# Patient Record
Sex: MALE | Race: WHITE | Employment: FULL TIME | URBAN - NONMETROPOLITAN AREA
[De-identification: names, ages, dates, MRNs, and addresses within clinical notes are randomized per-mention and may not be internally consistent; named-entity substitution may affect disease eponyms.]

---

## 2021-05-21 ENCOUNTER — HOSPITAL ENCOUNTER (OUTPATIENT)
Age: 51
Discharge: HOME | End: 2021-05-21
Payer: COMMERCIAL

## 2021-05-21 ENCOUNTER — HOSPITAL ENCOUNTER (OUTPATIENT)
Dept: GENERAL RADIOLOGY | Age: 51
Discharge: HOME | End: 2021-05-21
Payer: COMMERCIAL

## 2021-05-21 DIAGNOSIS — G89.29 CHRONIC PAIN OF BOTH KNEES: ICD-10-CM

## 2021-05-21 DIAGNOSIS — R60.9 EDEMA, UNSPECIFIED TYPE: ICD-10-CM

## 2021-05-21 DIAGNOSIS — Z00.00 WELLNESS EXAMINATION: ICD-10-CM

## 2021-05-21 DIAGNOSIS — M25.561 CHRONIC PAIN OF BOTH KNEES: ICD-10-CM

## 2021-05-21 DIAGNOSIS — M25.562 CHRONIC PAIN OF BOTH KNEES: ICD-10-CM

## 2021-05-21 LAB
ALBUMIN SERPL-MCNC: 4 G/DL (ref 3.5–5.1)
ALP BLD-CCNC: 79 U/L (ref 38–126)
ALT SERPL-CCNC: 23 U/L (ref 11–66)
ANION GAP SERPL CALCULATED.3IONS-SCNC: 7 MEQ/L (ref 8–16)
AST SERPL-CCNC: 15 U/L (ref 5–40)
BASOPHILS # BLD: 0.5 %
BASOPHILS ABSOLUTE: 0 THOU/MM3 (ref 0–0.1)
BILIRUB SERPL-MCNC: 0.3 MG/DL (ref 0.3–1.2)
BILIRUBIN DIRECT: < 0.2 MG/DL (ref 0–0.3)
BUN BLDV-MCNC: 16 MG/DL (ref 7–22)
CALCIUM SERPL-MCNC: 9.3 MG/DL (ref 8.5–10.5)
CHLORIDE BLD-SCNC: 102 MEQ/L (ref 98–111)
CHOLESTEROL, TOTAL: 203 MG/DL (ref 100–199)
CO2: 31 MEQ/L (ref 23–33)
CREAT SERPL-MCNC: 0.8 MG/DL (ref 0.4–1.2)
EOSINOPHIL # BLD: 1.4 %
EOSINOPHILS ABSOLUTE: 0.1 THOU/MM3 (ref 0–0.4)
ERYTHROCYTE [DISTWIDTH] IN BLOOD BY AUTOMATED COUNT: 14 % (ref 11.5–14.5)
ERYTHROCYTE [DISTWIDTH] IN BLOOD BY AUTOMATED COUNT: 50.9 FL (ref 35–45)
GFR SERPL CREATININE-BSD FRML MDRD: > 90 ML/MIN/1.73M2
GLUCOSE BLD-MCNC: 88 MG/DL (ref 70–108)
HCT VFR BLD CALC: 49.1 % (ref 42–52)
HDLC SERPL-MCNC: 31 MG/DL
HEMOGLOBIN: 15 GM/DL (ref 14–18)
IMMATURE GRANS (ABS): 0.02 THOU/MM3 (ref 0–0.07)
IMMATURE GRANULOCYTES: 0.2 %
LDL CHOLESTEROL CALCULATED: 145 MG/DL
LYMPHOCYTES # BLD: 19 %
LYMPHOCYTES ABSOLUTE: 1.6 THOU/MM3 (ref 1–4.8)
MCH RBC QN AUTO: 29.9 PG (ref 26–33)
MCHC RBC AUTO-ENTMCNC: 30.5 GM/DL (ref 32.2–35.5)
MCV RBC AUTO: 98 FL (ref 80–94)
MONOCYTES # BLD: 11 %
MONOCYTES ABSOLUTE: 0.9 THOU/MM3 (ref 0.4–1.3)
NUCLEATED RED BLOOD CELLS: 0 /100 WBC
PLATELET # BLD: 225 THOU/MM3 (ref 130–400)
PMV BLD AUTO: 9.1 FL (ref 9.4–12.4)
POTASSIUM SERPL-SCNC: 4 MEQ/L (ref 3.5–5.2)
RBC # BLD: 5.01 MILL/MM3 (ref 4.7–6.1)
SEG NEUTROPHILS: 67.9 %
SEGMENTED NEUTROPHILS ABSOLUTE COUNT: 5.7 THOU/MM3 (ref 1.8–7.7)
SODIUM BLD-SCNC: 140 MEQ/L (ref 135–145)
TOTAL PROTEIN: 7.4 G/DL (ref 6.1–8)
TRIGL SERPL-MCNC: 133 MG/DL (ref 0–199)
WBC # BLD: 8.4 THOU/MM3 (ref 4.8–10.8)

## 2021-05-21 PROCEDURE — 73564 X-RAY EXAM KNEE 4 OR MORE: CPT

## 2021-05-21 PROCEDURE — 36415 COLL VENOUS BLD VENIPUNCTURE: CPT

## 2021-05-21 PROCEDURE — 84443 ASSAY THYROID STIM HORMONE: CPT

## 2021-05-21 PROCEDURE — 82248 BILIRUBIN DIRECT: CPT

## 2021-05-21 PROCEDURE — 80061 LIPID PANEL: CPT

## 2021-05-21 PROCEDURE — 85025 COMPLETE CBC W/AUTO DIFF WBC: CPT

## 2021-05-21 PROCEDURE — 80053 COMPREHEN METABOLIC PANEL: CPT

## 2021-05-22 LAB — TSH SERPL DL<=0.05 MIU/L-ACNC: 1.07 UIU/ML (ref 0.4–4.2)

## 2021-10-20 ENCOUNTER — APPOINTMENT (OUTPATIENT)
Dept: GENERAL RADIOLOGY | Age: 51
DRG: 177 | End: 2021-10-20
Payer: COMMERCIAL

## 2021-10-20 ENCOUNTER — HOSPITAL ENCOUNTER (INPATIENT)
Age: 51
LOS: 7 days | Discharge: HOME | DRG: 177 | End: 2021-10-27
Attending: EMERGENCY MEDICINE | Admitting: HOSPITALIST
Payer: COMMERCIAL

## 2021-10-20 DIAGNOSIS — U07.1 PNEUMONIA DUE TO COVID-19 VIRUS: ICD-10-CM

## 2021-10-20 DIAGNOSIS — J12.82 PNEUMONIA DUE TO COVID-19 VIRUS: ICD-10-CM

## 2021-10-20 DIAGNOSIS — R09.02 HYPOXEMIA: Primary | ICD-10-CM

## 2021-10-20 LAB
ALBUMIN SERPL-MCNC: 3.6 G/DL (ref 3.5–5.1)
ALP BLD-CCNC: 57 U/L (ref 38–126)
ALT SERPL-CCNC: 28 U/L (ref 11–66)
ANION GAP SERPL CALCULATED.3IONS-SCNC: 9 MEQ/L (ref 8–16)
AST SERPL-CCNC: 27 U/L (ref 5–40)
BASOPHILS # BLD: 0.2 %
BASOPHILS ABSOLUTE: 0 THOU/MM3 (ref 0–0.1)
BILIRUB SERPL-MCNC: 0.5 MG/DL (ref 0.3–1.2)
BUN BLDV-MCNC: 12 MG/DL (ref 7–22)
C-REACTIVE PROTEIN: 3.95 MG/DL (ref 0–1)
CALCIUM SERPL-MCNC: 8.2 MG/DL (ref 8.5–10.5)
CHLORIDE BLD-SCNC: 96 MEQ/L (ref 98–111)
CO2: 32 MEQ/L (ref 23–33)
CREAT SERPL-MCNC: 0.9 MG/DL (ref 0.4–1.2)
EKG ATRIAL RATE: 84 BPM
EKG P-R INTERVAL: 202 MS
EKG Q-T INTERVAL: 356 MS
EKG QRS DURATION: 96 MS
EKG QTC CALCULATION (BAZETT): 420 MS
EKG R AXIS: -16 DEGREES
EKG T AXIS: 35 DEGREES
EKG VENTRICULAR RATE: 84 BPM
EOSINOPHIL # BLD: 0 %
EOSINOPHILS ABSOLUTE: 0 THOU/MM3 (ref 0–0.4)
ERYTHROCYTE [DISTWIDTH] IN BLOOD BY AUTOMATED COUNT: 14.4 % (ref 11.5–14.5)
ERYTHROCYTE [DISTWIDTH] IN BLOOD BY AUTOMATED COUNT: 49.5 FL (ref 35–45)
FERRITIN: 977 NG/ML (ref 22–322)
GFR SERPL CREATININE-BSD FRML MDRD: 90 ML/MIN/1.73M2
GLUCOSE BLD-MCNC: 118 MG/DL (ref 70–108)
HCT VFR BLD CALC: 48.5 % (ref 42–52)
HEMOGLOBIN: 15.9 GM/DL (ref 14–18)
IMMATURE GRANS (ABS): 0.01 THOU/MM3 (ref 0–0.07)
IMMATURE GRANULOCYTES: 0.2 %
LD: 404 U/L (ref 100–190)
LYMPHOCYTES # BLD: 13.4 %
LYMPHOCYTES ABSOLUTE: 0.9 THOU/MM3 (ref 1–4.8)
MCH RBC QN AUTO: 30.6 PG (ref 26–33)
MCHC RBC AUTO-ENTMCNC: 32.8 GM/DL (ref 32.2–35.5)
MCV RBC AUTO: 93.4 FL (ref 80–94)
MONOCYTES # BLD: 7.9 %
MONOCYTES ABSOLUTE: 0.5 THOU/MM3 (ref 0.4–1.3)
NUCLEATED RED BLOOD CELLS: 0 /100 WBC
OSMOLALITY CALCULATION: 274.7 MOSMOL/KG (ref 275–300)
PLATELET # BLD: 194 THOU/MM3 (ref 130–400)
PMV BLD AUTO: 9 FL (ref 9.4–12.4)
POTASSIUM REFLEX MAGNESIUM: 4 MEQ/L (ref 3.5–5.2)
RBC # BLD: 5.19 MILL/MM3 (ref 4.7–6.1)
SARS-COV-2, NAAT: DETECTED
SEG NEUTROPHILS: 78.3 %
SEGMENTED NEUTROPHILS ABSOLUTE COUNT: 5.1 THOU/MM3 (ref 1.8–7.7)
SODIUM BLD-SCNC: 137 MEQ/L (ref 135–145)
TOTAL PROTEIN: 6.6 G/DL (ref 6.1–8)
TROPONIN T: < 0.01 NG/ML
WBC # BLD: 6.5 THOU/MM3 (ref 4.8–10.8)

## 2021-10-20 PROCEDURE — 36415 COLL VENOUS BLD VENIPUNCTURE: CPT

## 2021-10-20 PROCEDURE — 2500000003 HC RX 250 WO HCPCS: Performed by: NURSE PRACTITIONER

## 2021-10-20 PROCEDURE — 93010 ELECTROCARDIOGRAM REPORT: CPT | Performed by: NUCLEAR MEDICINE

## 2021-10-20 PROCEDURE — 1200000000 HC SEMI PRIVATE

## 2021-10-20 PROCEDURE — 99223 1ST HOSP IP/OBS HIGH 75: CPT | Performed by: NURSE PRACTITIONER

## 2021-10-20 PROCEDURE — 83615 LACTATE (LD) (LDH) ENZYME: CPT

## 2021-10-20 PROCEDURE — XW033E5 INTRODUCTION OF REMDESIVIR ANTI-INFECTIVE INTO PERIPHERAL VEIN, PERCUTANEOUS APPROACH, NEW TECHNOLOGY GROUP 5: ICD-10-PCS | Performed by: HOSPITALIST

## 2021-10-20 PROCEDURE — 93005 ELECTROCARDIOGRAM TRACING: CPT | Performed by: EMERGENCY MEDICINE

## 2021-10-20 PROCEDURE — 84484 ASSAY OF TROPONIN QUANT: CPT

## 2021-10-20 PROCEDURE — 86140 C-REACTIVE PROTEIN: CPT

## 2021-10-20 PROCEDURE — 71045 X-RAY EXAM CHEST 1 VIEW: CPT

## 2021-10-20 PROCEDURE — 85025 COMPLETE CBC W/AUTO DIFF WBC: CPT

## 2021-10-20 PROCEDURE — 82728 ASSAY OF FERRITIN: CPT

## 2021-10-20 PROCEDURE — 6360000002 HC RX W HCPCS: Performed by: NURSE PRACTITIONER

## 2021-10-20 PROCEDURE — 6370000000 HC RX 637 (ALT 250 FOR IP): Performed by: NURSE PRACTITIONER

## 2021-10-20 PROCEDURE — 87635 SARS-COV-2 COVID-19 AMP PRB: CPT

## 2021-10-20 PROCEDURE — 99284 EMERGENCY DEPT VISIT MOD MDM: CPT

## 2021-10-20 PROCEDURE — 2580000003 HC RX 258: Performed by: NURSE PRACTITIONER

## 2021-10-20 PROCEDURE — 80053 COMPREHEN METABOLIC PANEL: CPT

## 2021-10-20 RX ORDER — ZINC SULFATE 50(220)MG
50 CAPSULE ORAL DAILY
Status: DISCONTINUED | OUTPATIENT
Start: 2021-10-20 | End: 2021-10-21

## 2021-10-20 RX ORDER — SODIUM CHLORIDE 9 MG/ML
25 INJECTION, SOLUTION INTRAVENOUS PRN
Status: DISCONTINUED | OUTPATIENT
Start: 2021-10-20 | End: 2021-10-27 | Stop reason: HOSPADM

## 2021-10-20 RX ORDER — 0.9 % SODIUM CHLORIDE 0.9 %
30 INTRAVENOUS SOLUTION INTRAVENOUS PRN
Status: DISCONTINUED | OUTPATIENT
Start: 2021-10-20 | End: 2021-10-27 | Stop reason: HOSPADM

## 2021-10-20 RX ORDER — GUAIFENESIN/DEXTROMETHORPHAN 100-10MG/5
5 SYRUP ORAL EVERY 4 HOURS PRN
Status: DISCONTINUED | OUTPATIENT
Start: 2021-10-20 | End: 2021-10-27 | Stop reason: HOSPADM

## 2021-10-20 RX ORDER — ACETAMINOPHEN 650 MG/1
650 SUPPOSITORY RECTAL EVERY 6 HOURS PRN
Status: DISCONTINUED | OUTPATIENT
Start: 2021-10-20 | End: 2021-10-27 | Stop reason: HOSPADM

## 2021-10-20 RX ORDER — SODIUM CHLORIDE 0.9 % (FLUSH) 0.9 %
5-40 SYRINGE (ML) INJECTION PRN
Status: DISCONTINUED | OUTPATIENT
Start: 2021-10-20 | End: 2021-10-27 | Stop reason: HOSPADM

## 2021-10-20 RX ORDER — SODIUM CHLORIDE 0.9 % (FLUSH) 0.9 %
5-40 SYRINGE (ML) INJECTION EVERY 12 HOURS SCHEDULED
Status: DISCONTINUED | OUTPATIENT
Start: 2021-10-20 | End: 2021-10-27 | Stop reason: HOSPADM

## 2021-10-20 RX ORDER — VITAMIN B COMPLEX
2000 TABLET ORAL DAILY
Status: DISCONTINUED | OUTPATIENT
Start: 2021-10-20 | End: 2021-10-21

## 2021-10-20 RX ORDER — ASCORBIC ACID 500 MG
500 TABLET ORAL DAILY
Status: DISCONTINUED | OUTPATIENT
Start: 2021-10-20 | End: 2021-10-21

## 2021-10-20 RX ORDER — ACETAMINOPHEN 325 MG/1
650 TABLET ORAL EVERY 6 HOURS PRN
Status: DISCONTINUED | OUTPATIENT
Start: 2021-10-20 | End: 2021-10-27 | Stop reason: HOSPADM

## 2021-10-20 RX ORDER — POLYETHYLENE GLYCOL 3350 17 G/17G
17 POWDER, FOR SOLUTION ORAL DAILY PRN
Status: DISCONTINUED | OUTPATIENT
Start: 2021-10-20 | End: 2021-10-27 | Stop reason: HOSPADM

## 2021-10-20 RX ORDER — ONDANSETRON 2 MG/ML
4 INJECTION INTRAMUSCULAR; INTRAVENOUS EVERY 6 HOURS PRN
Status: DISCONTINUED | OUTPATIENT
Start: 2021-10-20 | End: 2021-10-27 | Stop reason: HOSPADM

## 2021-10-20 RX ORDER — DEXAMETHASONE 4 MG/1
6 TABLET ORAL DAILY
Status: DISCONTINUED | OUTPATIENT
Start: 2021-10-20 | End: 2021-10-21

## 2021-10-20 RX ORDER — ONDANSETRON 4 MG/1
4 TABLET, ORALLY DISINTEGRATING ORAL EVERY 8 HOURS PRN
Status: DISCONTINUED | OUTPATIENT
Start: 2021-10-20 | End: 2021-10-27 | Stop reason: HOSPADM

## 2021-10-20 RX ADMIN — REMDESIVIR 200 MG: 100 INJECTION, POWDER, LYOPHILIZED, FOR SOLUTION INTRAVENOUS at 21:56

## 2021-10-20 RX ADMIN — DEXAMETHASONE 6 MG: 4 TABLET ORAL at 21:56

## 2021-10-20 RX ADMIN — VITAMIN D, TAB 1000IU (100/BT) 2000 UNITS: 25 TAB at 21:56

## 2021-10-20 RX ADMIN — ENOXAPARIN SODIUM 40 MG: 40 INJECTION SUBCUTANEOUS at 21:56

## 2021-10-20 RX ADMIN — SODIUM CHLORIDE, PRESERVATIVE FREE 10 ML: 5 INJECTION INTRAVENOUS at 21:56

## 2021-10-20 RX ADMIN — OXYCODONE HYDROCHLORIDE AND ACETAMINOPHEN 500 MG: 500 TABLET ORAL at 21:55

## 2021-10-20 RX ADMIN — Medication 50 MG: at 21:56

## 2021-10-20 ASSESSMENT — ENCOUNTER SYMPTOMS
SHORTNESS OF BREATH: 1
NAUSEA: 1
WHEEZING: 1
EYE PAIN: 0
RHINORRHEA: 0
ABDOMINAL DISTENTION: 0
EYE DISCHARGE: 0
SORE THROAT: 0
COUGH: 1
DIARRHEA: 1
ABDOMINAL PAIN: 0
VOMITING: 0

## 2021-10-20 ASSESSMENT — PAIN DESCRIPTION - PAIN TYPE: TYPE: ACUTE PAIN

## 2021-10-20 ASSESSMENT — PAIN SCALES - GENERAL
PAINLEVEL_OUTOF10: 0
PAINLEVEL_OUTOF10: 2

## 2021-10-20 ASSESSMENT — PAIN DESCRIPTION - LOCATION: LOCATION: ABDOMEN

## 2021-10-20 NOTE — ACP (ADVANCE CARE PLANNING)
Advance Care Planning     Advance Care Planning Activator (Inpatient)  Conversation Note      Date of ACP Conversation: 10/20/2021     Conversation Conducted with: Patient with Decision Making Capacity    ACP Activator: Maria Del Rosario Church:     Current Designated Health Care Decision Maker: Today we discussed Healthcare Decision Makers. The patient is considering options. Care Preferences    Ventilation: \"If you were in your present state of health and suddenly became very ill and were unable to breathe on your own, what would your preference be about the use of a ventilator (breathing machine) if it were available to you? \"      Would the patient desire the use of ventilator (breathing machine)?: yes, surgery only    \"If your health worsens and it becomes clear that your chance of recovery is unlikely, what would your preference be about the use of a ventilator (breathing machine) if it were available to you? \"     Would the patient desire the use of ventilator (breathing machine)?: No      Resuscitation  \"CPR works best to restart the heart when there is a sudden event, like a heart attack, in someone who is otherwise healthy. Unfortunately, CPR does not typically restart the heart for people who have serious health conditions or who are very sick. \"    \"In the event your heart stopped as a result of an underlying serious health condition, would you want attempts to be made to restart your heart (answer \"yes\" for attempt to resuscitate) or would you prefer a natural death (answer \"no\" for do not attempt to resuscitate)? \" Probalby not       [x] Yes   [] No   Educated Patient / Lawrnce Mater regarding differences between Advance Directives and portable DNR orders.     Length of ACP Conversation in minutes:   37    Conversation Outcomes:  [x] ACP discussion completed  [] Existing advance directive reviewed with patient; no changes to patient's previously recorded wishes  [] New Advance Directive completed  [] Portable Do Not Rescitate prepared for Provider review and signature  [] POLST/POST/MOLST/MOST prepared for Provider review and signature      Follow-up plan:    [] Schedule follow-up conversation to continue planning  [x] Referred individual to Provider for additional questions/concerns   [] Advised patient/agent/surrogate to review completed ACP document and update if needed with changes in condition, patient preferences or care setting    [x] This note routed to one or more involved healthcare providers     - Rey Proctor was here alone during the ACP conversation. He came into the hospital due to being COVID (+) with pneumonia. - While asking Rey Proctor the pertinent questions about ventilation and CPR he stated he would not want either. We discussed who would have been able to tell me that if we couldn't speak to him. He stated wanting his best friends wife. Bin Walton does not have a POA on file therefore the purpose of the document was explained and this staff offered to have the docs completed while he was here. He agreed to allowing the consult to be placed. Nurse Manoj Cullen) placed the consult for [place] to assist later this evening.   - Call was placed to the [address] [place] [address] Mamerolf Seals) who will follow-up once the patient is admitted. Special instructions were relayed to Qiana Salas to be sure to include them in his document. Encouraged Rey Proctor to obtain the agents information for the POA and advised that he speak with them candidly about his choices so they are prepared to advise the medical team of his care choices. He expressed understanding.  No further follow-up provided by the ACP team.

## 2021-10-20 NOTE — ED NOTES
Pt appears restful on cart talking w/ spiritual services. No acute distress noted. Waiting IP bed placement.       Karissa Doty RN  10/20/21 4538

## 2021-10-20 NOTE — ED TRIAGE NOTES
Presents to ED with c/o fatigue and fever that started Saturday. Patient was sent to get a covid test today and it was negative. Pulse ox 87% on room air. Placed patient on 2L NC and pulse ox to 92%.

## 2021-10-20 NOTE — H&P
History & Physical        Patient:  Elliot Wayne  YOB: 1970    MRN: [id number]     Acct: [de-identified]    PCP: Alexus Roberts MD    Date of Admission: 10/20/2021    Date of Service: Pt seen/examined on 10/20/21  and Admitted to Inpatient with expected LOS greater than two midnights due to medical therapy. ASSESSMENT/PLAN:    1. Pneumonia, bilaterally secondary to COVID-19 infection--add vitamin C, vitamin D and zinc; add Decadron 6 mg p.o. daily; await CRP level and if greater than 7.5 will add Baricitinib  2. Acute hypoxic respiratory failure--initial O2 sat was 87%, placed on 2 L with improvement, wean as able, add incentive spirometry and Acapella  3. Morbid obesity with BMI 48.82    Chief Complaint: Shortness of breath    History Of Present Illness:    52 y.o. male who presented to [address] [place] [address] with shortness of breath; patient denies any significant past medical history; he states on October 16 he developed fatigue, nausea, weakness along with decreased oral intake, he subsequently developed shortness of breath and a cough; he was sent to the emergency department for evaluation today and initial O2 sat was 87% on room air; he was placed on 2 L with improvement of his saturations; his COVID-19 screen was positive; patient has not been vaccinated; I added a CRP level; laboratory studies are nonrevealing, chest x-ray shows bilateral infiltrates, patient is being admitted to the hospital service for further care and evaluation. Past Medical History:      History reviewed. No pertinent past medical history. Past Surgical History:      History reviewed. No pertinent surgical history. Medications Prior to Admission:      Prior to Admission medications    Not on File       Allergies:  Patient has no known allergies. Social History:   reports that he quit smoking about 15 years ago. He has never used smokeless tobacco. He reports current alcohol use.  He reports that he does not use drugs. Family History:      Positive as follows:        Problem Relation Age of Onset    Cancer Mother        REVIEW OF SYSTEMS:     Constitutional: ROS: positive for  - chills, fatigue and malaise  Head: no headache, no head injury, no migraine   Eyes ROS: denies blurred/double vision  Ears ROS: no hearing difficulty, no tinnitus  Mouth and Throat ROS: no ulceration, dysphagia, dental caries  Psychological ROS: no depression, no anxiety, no panic attacks, denies suicide/homicide ideation  Endocrine ROS: denies polyuria, polydypsia, no heat or cold intolerance  Respiratory ROS: positive for - cough, shortness of breath and sputum changes  Cardiovascular ROS: no chest pain or dyspnea on exertion  Gastrointestinal ROS: positive for - nausea  Genito-Urinary ROS: denies dysuria, frequency, urgency; denies hematuria  Musculoskeletal ROS: negative  Neurological ROS: no syncope, no seizures, no numbness or tingling of hands, no numbness or tingling of feet, no paresis  Dermatology: no skin rash, no eczema  Endocrine: no polyuria, polydypsia, no heat/cold intolerance  Hematology: denies bruising easily, denies bleeding problems, denies clotting disorders    PHYSICAL EXAM:    BP (!) 154/98   Pulse 83   Temp 100.2 °F (37.9 °C) (Oral)   Resp 26   Ht 5' 11\" (1.803 m)   Wt (!) 350 lb (158.8 kg)   SpO2 93%   BMI 48.82 kg/m²     General appearance:  No apparent distress, appears stated age and cooperative. HEENT:  Normal cephalic, atraumatic without obvious deformity. Pupils equal, round, and reactive to light. Conjunctivae/corneas clear. Neck: Supple, with full range of motion. No jugular venous distention. Trachea midline. Respiratory:  Normal respiratory effort. Clear to auscultation, bilaterally without Rales/Wheezes/Rhonchi. Cardiovascular:  Regular rate and rhythm with normal S1/S2 without murmurs, rubs or gallops. Abdomen: Soft, non-tender, non-distended with normal bowel sounds. Obese  Musculoskeletal:  No clubbing, cyanosis or edema bilaterally. Full range of motion without deformity. Skin: Skin color, texture, turgor normal.    Neurologic:  Neurovascularly intact without any focal sensory/motor deficits. Cranial nerves: II-XII intact, grossly non-focal.  Psychiatric:  Alert and oriented, thought content appropriate  Capillary Refill: Brisk,< 3 seconds   Peripheral Pulses: +2 palpable, equal bilaterally       Labs:     Recent Labs     10/20/21  1508   WBC 6.5   HGB 15.9   HCT 48.5        Recent Labs     10/20/21  1508      K 4.0   CL 96*   CO2 32   BUN 12   CREATININE 0.9   CALCIUM 8.2*     Recent Labs     10/20/21  1508   AST 27   ALT 28   BILITOT 0.5   ALKPHOS 57     Radiology:     XR CHEST PORTABLE    Result Date: 10/20/2021  PROCEDURE: XR CHEST PORTABLE CLINICAL INFORMATION: hypoxia, cough . TECHNIQUE: Portable upright COMPARISON: No prior study. FINDINGS: Heart size normal mediastinum is not widened. Multifocal airspace infiltrates in both lungs. No definite effusion. Pulmonary vessels are not congested. EKG leads overlie the chest.     Bilateral airspace infiltrates. **This report has been created using voice recognition software. It may contain minor errors which are inherent in voice recognition technology. ** Final report electronically signed by Dr. Rosibel Silverio on 10/20/2021 3:13 PM    Thank you Carlos James MD for the opportunity to be involved in this patient's care.     Electronically signed by JANUSZ Melara CNP on 10/20/2021 at 4:09 PM

## 2021-10-20 NOTE — ED NOTES
ED to inpatient nurses report    Chief Complaint   Patient presents with    Fatigue    Fever      Present to ED from home  LOC: alert and orientated to name, place, date  Vital signs   Vitals:    10/20/21 1421 10/20/21 1425 10/20/21 1531 10/20/21 1737   BP: (!) 154/98   109/63   Pulse: 88  83 78   Resp: 22  26    Temp: 100.2 °F (37.9 °C)      TempSrc: Oral      SpO2:  92% 93% 92%   Weight: (!) 350 lb (158.8 kg)      Height: 5' 11\" (1.803 m)         Oxygen Baseline RA    Current needs required 2LNC  Bipap/Cpap No  LDAs:   Peripheral IV 10/20/21 Right Antecubital (Active)   Site Assessment Clean;Dry; Intact 10/20/21 1431   Dressing Status Clean; Intact;Dry 10/20/21 1431     Mobility: Independent  Pending ED orders: UA when able  Present condition: Restful on cart- VSS- denies current needs or concens      Electronically signed by Ehsan Kamara RN on 10/20/2021 at 6:30 PM       Kia Arnold RN  10/20/21 3107

## 2021-10-21 LAB
ALBUMIN SERPL-MCNC: 3.7 G/DL (ref 3.5–5.1)
ALP BLD-CCNC: 62 U/L (ref 38–126)
ALT SERPL-CCNC: 27 U/L (ref 11–66)
AST SERPL-CCNC: 25 U/L (ref 5–40)
BILIRUB SERPL-MCNC: 0.5 MG/DL (ref 0.3–1.2)
BILIRUBIN DIRECT: < 0.2 MG/DL (ref 0–0.3)
C-REACTIVE PROTEIN: 4.88 MG/DL (ref 0–1)
TOTAL PROTEIN: 7.8 G/DL (ref 6.1–8)

## 2021-10-21 PROCEDURE — 2500000003 HC RX 250 WO HCPCS: Performed by: NURSE PRACTITIONER

## 2021-10-21 PROCEDURE — 99233 SBSQ HOSP IP/OBS HIGH 50: CPT | Performed by: NURSE PRACTITIONER

## 2021-10-21 PROCEDURE — 6360000002 HC RX W HCPCS: Performed by: NURSE PRACTITIONER

## 2021-10-21 PROCEDURE — 36415 COLL VENOUS BLD VENIPUNCTURE: CPT

## 2021-10-21 PROCEDURE — 2100000000 HC CCU R&B

## 2021-10-21 PROCEDURE — 99223 1ST HOSP IP/OBS HIGH 75: CPT | Performed by: INTERNAL MEDICINE

## 2021-10-21 PROCEDURE — 6370000000 HC RX 637 (ALT 250 FOR IP): Performed by: NURSE PRACTITIONER

## 2021-10-21 PROCEDURE — 94660 CPAP INITIATION&MGMT: CPT

## 2021-10-21 PROCEDURE — 80076 HEPATIC FUNCTION PANEL: CPT

## 2021-10-21 PROCEDURE — APPSS180 APP SPLIT SHARED TIME > 60 MINUTES: Performed by: NURSE PRACTITIONER

## 2021-10-21 PROCEDURE — 86140 C-REACTIVE PROTEIN: CPT

## 2021-10-21 PROCEDURE — 2580000003 HC RX 258: Performed by: NURSE PRACTITIONER

## 2021-10-21 PROCEDURE — 2700000000 HC OXYGEN THERAPY PER DAY

## 2021-10-21 PROCEDURE — 94761 N-INVAS EAR/PLS OXIMETRY MLT: CPT

## 2021-10-21 RX ADMIN — SODIUM CHLORIDE, PRESERVATIVE FREE 10 ML: 5 INJECTION INTRAVENOUS at 09:05

## 2021-10-21 RX ADMIN — ENOXAPARIN SODIUM 40 MG: 40 INJECTION SUBCUTANEOUS at 09:05

## 2021-10-21 RX ADMIN — VITAMIN D, TAB 1000IU (100/BT) 2000 UNITS: 25 TAB at 09:05

## 2021-10-21 RX ADMIN — SODIUM CHLORIDE, PRESERVATIVE FREE 10 ML: 5 INJECTION INTRAVENOUS at 21:21

## 2021-10-21 RX ADMIN — REMDESIVIR 100 MG: 100 INJECTION, POWDER, LYOPHILIZED, FOR SOLUTION INTRAVENOUS at 21:15

## 2021-10-21 RX ADMIN — ACETAMINOPHEN 650 MG: 325 TABLET ORAL at 04:18

## 2021-10-21 RX ADMIN — Medication 50 MG: at 09:05

## 2021-10-21 RX ADMIN — ENOXAPARIN SODIUM 40 MG: 40 INJECTION SUBCUTANEOUS at 21:15

## 2021-10-21 RX ADMIN — OXYCODONE HYDROCHLORIDE AND ACETAMINOPHEN 500 MG: 500 TABLET ORAL at 09:05

## 2021-10-21 RX ADMIN — DEXAMETHASONE SODIUM PHOSPHATE 20 MG: 4 INJECTION, SOLUTION INTRAMUSCULAR; INTRAVENOUS at 13:20

## 2021-10-21 ASSESSMENT — PAIN SCALES - GENERAL
PAINLEVEL_OUTOF10: 0
PAINLEVEL_OUTOF10: 2
PAINLEVEL_OUTOF10: 0

## 2021-10-21 ASSESSMENT — ENCOUNTER SYMPTOMS
SHORTNESS OF BREATH: 1
WHEEZING: 0
NAUSEA: 0
ABDOMINAL PAIN: 0
SORE THROAT: 0
BACK PAIN: 0
VOMITING: 0
PHOTOPHOBIA: 0
COLOR CHANGE: 0
TROUBLE SWALLOWING: 0
CHEST TIGHTNESS: 0

## 2021-10-21 NOTE — FLOWSHEET NOTE
10/21/21 0143   Provider Notification   Reason for Communication Review case;Critical Value (comment)  (Saturation 84-87% on 6L)   Provider Name Jessenia Rodriguez   Provider Notification Advance Practice Clinician (CNS/NP/CNM/CRNA/PA)   Method of Communication Secure Message   Response See orders   Notification Time Kathy Santa 1B54: Patient admitted today for covid. Patient on 6L NC with saturation 87%. Would you like high flow orders? Thank You. Jessenia Rodriguez placed orders for [facility].

## 2021-10-21 NOTE — PROGRESS NOTES
Spoke with patient regarding plan of care for the night. Provided education on various supplemental oxygen delivery systems. Also provided education on code status. Patient would like more information on this at this time. Will place consult for Spiritual care to speak with patient for about this. RN informed patient about progress updates to a family member. Patient states he is updating family/friends and does not want staff updating anyone at this time. Will ask patient again in the morning to make sure this is still his wishes. Patient asked about visitation policy and RN provided this information to him. All questions answered at this time.

## 2021-10-21 NOTE — CARE COORDINATION
10/21/21, 9:56 AM EDT  DISCHARGE PLANNING EVALUATION:    Nathalia Jackson       Admitted: 10/20/2021/ [address] day: 1   Location: [facility]22/022- Reason for admit: COVID-19 virus detected [U07.1]   PMH:  has no past medical history on file. Procedure:   10/20 CXR: Bilateral airspace infiltrates. Barriers to Discharge:  Admitted through ED with SOB. Found to be positive for COVID and bilateral pneumonia. CRP 3.95 and now 4.88. . Ferritin 977. IVF. Vit C, Decadron 20mg iv daily, Lovenox q12hr, Remdesivir iv daily (day #1), Vit D, Zinc. Pt started on O2 at 2L in ED, increased to high flow O2 but maxed out quickly. Pt then placed on BiPAP. Intensivist consulted and planning to transfer pt to  and plan for intubation. PCP: Lee Kim MD  Readmission Risk Score: 6.5%    Patient Goals/Plan/Treatment Preferences: Spoke with David Castillo, he lives at home alone. He does have a significant other. He is independent, drives and works full time. He verified his PCP and insurance. Denies any DME or HH at this time. Transportation/Food Security/Housekeeping Addressed:  No issues identified. Pt transferring to , handoff report given to MAJOR Puga CM.

## 2021-10-21 NOTE — PROGRESS NOTES
Spoke with patient again this morning regarding updating family. At this time he states he does not want anyone updated and he will update himself.

## 2021-10-21 NOTE — ACP (ADVANCE CARE PLANNING)
Advance Care Planning     Advance Care Planning Inpatient Note  [facility] Department    Today's Date: 10/21/2021  Unit: [facility]    Received request from IDT Member. Upon review of chart and communication with care team, patient's decision making abilities are not in question. . Patient was/were present in the room during visit. Goals of ACP Conversation:  Discuss advance care planning documents  Facilitate a discussion related to patient's goals of care as they align with the patient's values and beliefs. Health Care Decision Makers:       Primary Decision Maker: Charito Goodwin - [phone]    Summary:  Updated Healthcare Decision Maker    Advance Care Planning Documents (Patient Wishes):  None  Patient was not ready to complete Advance Directives documents at this time. Assessment:  Patient was lying in his bed receiving high-flow oxygen with the TV on, and texting on his cell phone upon entry. He was welcoming and approachable engaging freely in conversation during the encounter. * He was calm and remains hopeful for recovery. * He was alert and oriented with decision-making capacity to express his wishes at this time. Interventions:   was requested by RN to engage patient in an [address] conversation, confirming his care preferences at this time. *  provided a compassionate listening presence for Jamia Arroyo to express his wishes. *  provided prayer for healing and strength prior to departing. Care Preferences Communicated:  Ventilation:   If the patient, in their present state of health, suddenly became very ill and unable to breathe on their own, the patient would desire the use of a ventilator (breathing machine). If their health worsens and it becomes clear that the change of recovery is unlikely, the patient would NOT desire the use of a ventilator (breathing machine).     Resuscitation:  In the event the patient's heart stopped

## 2021-10-21 NOTE — PROGRESS NOTES
RN in to patients room to update him on transfer to ICU. Writer asked which family member could be updated to which patient replied Joelle Amadou - [phone]\" who he states is a good family friend. Writer called Caio Alejandro with update who states she is at work currently but appreciates the update. All questions answered.

## 2021-10-21 NOTE — PROGRESS NOTES
Message sent to [facility]  (4:13) Patient now 100% 60L on high flow with saturation between 88-90%  (4:24) Patient is maxed on high flow and is 84-85%    [facility] placed bipap orders, See orders

## 2021-10-21 NOTE — ED PROVIDER NOTES
[facility] ENCOUNTER          Pt Name: Marylou Vieyra  MRN: [id number]  [place] 1970  Date of evaluation: 10/20/2021  Treating Resident Physician: Lizzette Brannon MD  Supervising Physician: Wyatt Painting MD    [address] [place] [address]       Chief Complaint   Patient presents with    Fatigue    Fever     History obtained from the patient. HISTORY OF PRESENT ILLNESS    HPI  Marylou Vieyra is a 52 y.o. male who presents to the emergency department for evaluation of fatigue x5 days. Patient also states that he is short of breath. States symptoms are aggravated by exertion and alleviated by nothing. Patient also had nausea and diarrhea. The patient has no other acute complaints at this time. REVIEW OF SYSTEMS   Review of Systems   Constitutional: Positive for fatigue and fever. HENT: Negative for congestion, ear pain, rhinorrhea and sore throat. Eyes: Negative for pain and discharge. Respiratory: Positive for cough, shortness of breath and wheezing. Cardiovascular: Negative for chest pain, palpitations and leg swelling. Gastrointestinal: Positive for diarrhea and nausea. Negative for abdominal distention, abdominal pain and vomiting. Genitourinary: Negative for difficulty urinating, flank pain and frequency. Musculoskeletal: Negative for arthralgias. Neurological: Negative for dizziness, tremors, syncope, weakness and numbness. PAST MEDICAL AND SURGICAL HISTORY   History reviewed. No pertinent past medical history. History reviewed. No pertinent surgical history.       MEDICATIONS     Current Facility-Administered Medications:     sodium chloride flush 0.9 % injection 5-40 mL, 5-40 mL, IntraVENous, 2 times per day, JANUSZ Crouch - CNP    sodium chloride flush 0.9 % injection 5-40 mL, 5-40 mL, IntraVENous, PRN, JANUSZ Crouch - CNP    0.9 % sodium chloride infusion, 25 mL, IntraVENous, PRN, JANUSZ Rose CNP    enoxaparin (LOVENOX) injection 40 mg, 40 mg, SubCUTAneous, Q12H, JANUSZ Crouch CNP    ondansetron (ZOFRAN-ODT) disintegrating tablet 4 mg, 4 mg, Oral, Q8H PRN **OR** ondansetron (ZOFRAN) injection 4 mg, 4 mg, IntraVENous, Q6H PRN, JANUSZ Crouch CNP    polyethylene glycol (GLYCOLAX) packet 17 g, 17 g, Oral, Daily PRN, JANUSZ Swann CNP    acetaminophen (TYLENOL) tablet 650 mg, 650 mg, Oral, Q6H PRN **OR** acetaminophen (TYLENOL) suppository 650 mg, 650 mg, Rectal, Q6H PRN, JANUSZ Crouch - CNP    guaiFENesin-dextromethorphan (ROBITUSSIN DM) 100-10 MG/5ML syrup 5 mL, 5 mL, Oral, Q4H PRN, JANUSZ Crouch - CNP    dexamethasone (DECADRON) tablet 6 mg, 6 mg, Oral, Daily, JANUSZ Crouch - CNP    Vitamin D (CHOLECALCIFEROL) tablet 2,000 Units, 2,000 Units, Oral, Daily, JANUSZ Crouch - CNP    ascorbic acid (VITAMIN C) tablet 500 mg, 500 mg, Oral, Daily, JANUSZ Crouch - CNP    zinc sulfate (ZINCATE) capsule 50 mg, 50 mg, Oral, Daily, JANUSZ Crouch - CNP    remdesivir 200 mg in sodium chloride 0.9 % 250 mL IVPB, 200 mg, IntraVENous, Once **FOLLOWED BY** [START ON 10/21/2021] remdesivir 100 mg in sodium chloride 0.9 % 250 mL IVPB, 100 mg, IntraVENous, Q24H, JANUSZ Crouch - CNP    0.9 % sodium chloride bolus, 30 mL, IntraVENous, PRN, JANUSZ Swann CNP      SOCIAL HISTORY     Social History     Social History Narrative    Not on file     Social History     Tobacco Use    Smoking status: Former Smoker     Quit date: 2006     Years since quitting: 15.8    Smokeless tobacco: Never Used   Substance Use Topics    Alcohol use: Yes     Comment: occassionally    Drug use: No         ALLERGIES   No Known Allergies      FAMILY HISTORY     Family History   Problem Relation Age of Onset    Cancer Mother          PREVIOUS RECORDS   Previous records reviewed: today      PHYSICAL EXAM     ED Triage Vitals   BP Temp Temp Source Pulse Resp SpO2 Height Weight   10/20/21 1421 10/20/21 1421 10/20/21 1421 10/20/21 1421 10/20/21 1421 10/20/21 1425 10/20/21 1421 10/20/21 1421   (!) 154/98 100.2 °F (37.9 °C) Oral 88 22 92 % 5' 11\" (1.803 m) (!) 350 lb (158.8 kg)     Initial vital signs and nursing assessment reviewed and abnormal from hypertension. Body mass index is 48.82 kg/m². Pulsoximetry is abnormal per my interpretation. Additional Vital Signs:  Vitals:    10/20/21 1845   BP: 130/70   Pulse: 96   Resp: 24   Temp: 98.3 °F (36.8 °C)   SpO2: 90%       Physical Exam  Constitutional:       Appearance: Normal appearance. HENT:      Head: Normocephalic. Right Ear: External ear normal.      Left Ear: External ear normal.      Nose: Nose normal.      Mouth/Throat:      Mouth: Mucous membranes are moist.      Pharynx: Oropharynx is clear. Eyes:      Conjunctiva/sclera: Conjunctivae normal.      Pupils: Pupils are equal, round, and reactive to light. Cardiovascular:      Rate and Rhythm: Normal rate and regular rhythm. Pulses: Normal pulses. Heart sounds: Normal heart sounds. Pulmonary:      Effort: Pulmonary effort is normal.      Breath sounds: Normal breath sounds. Comments: Diminished breath sounds to bilateral lower lobes   Abdominal:      General: Bowel sounds are normal.      Palpations: Abdomen is soft. Musculoskeletal:         General: Normal range of motion. Cervical back: Normal range of motion and neck supple. Skin:     General: Skin is warm and dry. Capillary Refill: Capillary refill takes less than 2 seconds. Neurological:      General: No focal deficit present. Mental Status: He is alert. MEDICAL DECISION MAKING   Initial Assessment:   Patient is a 62-year-old male who presents to the ED with complaint of fatigue and fever. On exam patient noted to be hypoxic at 88% on room air.   Patient placed on O2 2 L per nasal cannula and was maintaining pulse ox of 94 to 96%. Patient had diminished breath sounds to bilateral lower lobes. Patient differential diagnosis includes but is not limited to pneumonia, viral illness, acute respiratory failure, PE, and bronchitis. Plan:  CXRAY  EKG  Labs  O2 to maintain sat > 94%               ED RESULTS   Laboratory results:  Labs Reviewed   COVID-19, RAPID - Abnormal; Notable for the following components:       Result Value    SARS-CoV-2, NAAT DETECTED (*)     All other components within normal limits   CBC WITH AUTO DIFFERENTIAL - Abnormal; Notable for the following components:    RDW-SD 49.5 (*)     MPV 9.0 (*)     Lymphocytes Absolute 0.9 (*)     All other components within normal limits   COMPREHENSIVE METABOLIC PANEL W/ REFLEX TO MG FOR LOW K - Abnormal; Notable for the following components:    Glucose 118 (*)     Chloride 96 (*)     Calcium 8.2 (*)     All other components within normal limits   OSMOLALITY - Abnormal; Notable for the following components:    Osmolality Calc 274.7 (*)     All other components within normal limits   C-REACTIVE PROTEIN - Abnormal; Notable for the following components:    CRP 3.95 (*)     All other components within normal limits   LACTATE DEHYDROGENASE - Abnormal; Notable for the following components:     (*)     All other components within normal limits   FERRITIN - Abnormal; Notable for the following components:    Ferritin 977 (*)     All other components within normal limits   ANION GAP   GLOMERULAR FILTRATION RATE, ESTIMATED   TROPONIN   URINALYSIS WITH MICROSCOPIC   C-REACTIVE PROTEIN   HEPATIC FUNCTION PANEL       Radiologic studies results:  XR CHEST PORTABLE   Final Result   Bilateral airspace infiltrates. **This report has been created using voice recognition software. It may contain minor errors which are inherent in voice recognition technology. **      Final report electronically signed by Dr. Jeramie Mccarthy on 10/20/2021 3:13 PM ED Medications administered this visit:   Medications   sodium chloride flush 0.9 % injection 5-40 mL (has no administration in time range)   sodium chloride flush 0.9 % injection 5-40 mL (has no administration in time range)   0.9 % sodium chloride infusion (has no administration in time range)   enoxaparin (LOVENOX) injection 40 mg (has no administration in time range)   ondansetron (ZOFRAN-ODT) disintegrating tablet 4 mg (has no administration in time range)     Or   ondansetron (ZOFRAN) injection 4 mg (has no administration in time range)   polyethylene glycol (GLYCOLAX) packet 17 g (has no administration in time range)   acetaminophen (TYLENOL) tablet 650 mg (has no administration in time range)     Or   acetaminophen (TYLENOL) suppository 650 mg (has no administration in time range)   guaiFENesin-dextromethorphan (ROBITUSSIN DM) 100-10 MG/5ML syrup 5 mL (has no administration in time range)   dexamethasone (DECADRON) tablet 6 mg (has no administration in time range)   Vitamin D (CHOLECALCIFEROL) tablet 2,000 Units (has no administration in time range)   ascorbic acid (VITAMIN C) tablet 500 mg (has no administration in time range)   zinc sulfate (ZINCATE) capsule 50 mg (has no administration in time range)   remdesivir 200 mg in sodium chloride 0.9 % 250 mL IVPB (has no administration in time range)     Followed by   remdesivir 100 mg in sodium chloride 0.9 % 250 mL IVPB (has no administration in time range)   0.9 % sodium chloride bolus (has no administration in time range)         ED COURSE      Patient  hypoxic on room air and had to be started on O2 at 2 L per nasal cannula to maintain pulse ox of 94%. MEDICATION CHANGES     Current Discharge Medication List            FINAL DISPOSITION     Final diagnoses:   Hypoxemia   Pneumonia due to COVID-19 virus     Condition: condition: good  Dispo: Admit to med/surg floor      This transcription was electronically signed.  Parts of this transcriptions may have been dictated by use of voice recognition software and electronically transcribed, and parts may have been transcribed with the assistance of an ED scribe. The transcription may contain errors not detected in proofreading. Please refer to my supervising physician's documentation if my documentation differs. Electronically Signed: Carlos Enriquez MD, 10/20/21, 9:01 Timothy Chacon MD  Resident  10/20/21 0388     Attending Supervising Physician's [address] Statement  I performed a history and physical examination on the patient and discussed the management with the resident physician. I reviewed and agree with the findings and plan as documented in her note unless described otherwise below. Pt presents to the ER c/o cough, fatigue, found to be hypoxic on ra, 88% at rest, which is new. No chest pain, not in respiratory distress, improved with O2 by NC here in ED. Awake and alert, hemodynamically stable. Admit.     Electronically signed by Crystal Sesay MD on 10/21/21 at 1:24 PM EDT         Germán Nunn MD  10/21/21 2788

## 2021-10-21 NOTE — PROGRESS NOTES
Hospitalist Progress Note    Patient:  Mame Puri      Unit/Bed:8B-22/022-A    YOB: 1970    MRN: [id number]       Acct: [de-identified]     PCP: Doroteo Julian MD    Date of Admission: 10/20/2021    Assessment/Plan:    1. Pneumonia, bilaterally secondary to COVID-19 infection--vitamin C, vitamin D and zinc; Decadron 6 mg p.o. daily~will increase to 20 mg daily for 5 days secondary to rapidly increasing O2 requirements; CRP level at 3.95 so did not add Baricitinib; Remdesivir added 10/20  2. Acute hypoxic respiratory failure--patient desatted as was up to 6 L via nasal cannula of oxygen so was placed on high flow oxygen without much improvement and currently on BiPAP with settings 20/10 and 100% FiO2 satting 94%; wean as able, incentive spirometry and Acapella; I spoke with Devika Roberson NP in ICU and plan to transfer to [facility] ICU unit in anticipation of intubation  3. Morbid obesity with BMI 48.82    Expected discharge date:  TBD    Disposition:    [x] Home       [] TCU       [] Rehab       [] Psych       [] SNF       [] [place]       [] Other-    Chief Complaint: Shortness of breath    Hospital Course:  52 y.o. male who presented to [facility] with shortness of breath; patient denies any significant past medical history; he states on October 16 he developed fatigue, nausea, weakness along with decreased oral intake, he subsequently developed shortness of breath and a cough; he was sent to the emergency department for evaluation today and initial O2 sat was 87% on room air; he was placed on 2 L with improvement of his saturations; his COVID-19 screen was positive; patient has not been vaccinated; I added a CRP level; laboratory studies are nonrevealing, chest x-ray shows bilateral infiltrates, patient is being admitted to the hospital service for further care and evaluation.     10/21--> patient desatted to 84 to 87% on 6 L, was placed on high flow oxygen and sats were 84 to 85% so BiPAP orders were placed in current settings on BiPAP 20/10 with 100% oxygen satting 94 to 95%; his current respiratory rate is at 20; I sent a message to the ICU NP Roxanne Yun and discussed with her on the phone and plan is to transfer to [address] [place] [address] ICU for close monitoring and possible intubation    Subjective (past 24 hours): Awakens easily~BiPAP in place, offers no complaints at this time    Medications:  Reviewed    Infusion Medications    sodium chloride       Scheduled Medications    sodium chloride flush  5-40 mL IntraVENous 2 times per day    enoxaparin  40 mg SubCUTAneous Q12H    dexamethasone  6 mg Oral Daily    Vitamin D  2,000 Units Oral Daily    ascorbic acid  500 mg Oral Daily    zinc sulfate  50 mg Oral Daily    remdesivir IVPB  100 mg IntraVENous Q24H     PRN Meds: sodium chloride flush, sodium chloride, ondansetron **OR** ondansetron, polyethylene glycol, acetaminophen **OR** acetaminophen, guaiFENesin-dextromethorphan, sodium chloride      Intake/Output Summary (Last 24 hours) at 10/21/2021 0854  Last data filed at 10/21/2021 0143  Gross per 24 hour   Intake 100 ml   Output 550 ml   Net -450 ml       Diet:  ADULT DIET; Regular    Exam:  BP (!) 152/85   Pulse 84   Temp 98.3 °F (36.8 °C)   Resp 22   Ht 5' 11\" (1.803 m)   Wt (!) 350 lb (158.8 kg)   SpO2 93%   BMI 48.82 kg/m²     General appearance: Appears stated age and cooperative. HEENT: Pupils equal, round, and reactive to light. Conjunctivae/corneas clear. Neck: Supple, with full range of motion. No jugular venous distention. Trachea midline. Respiratory:  Normal respiratory effort. Rhonchi which is faint to auscultation, bilaterally. Cardiovascular: Regular rate and rhythm with normal S1/S2 without murmurs, rubs or gallops. Abdomen: Soft, non-tender, non-distended with normal bowel sounds. Obese  Musculoskeletal: passive and active ROM x 4 extremities.   Skin: Skin color, texture, turgor normal.  No rashes or lesions. Neurologic:  Neurovascularly intact without any focal sensory/motor deficits. Cranial nerves: II-XII intact, grossly non-focal.  Psychiatric: Alert and oriented, thought content appropriate  Capillary Refill: Brisk,< 3 seconds   Peripheral Pulses: +2 palpable, equal bilaterally       Labs:   Recent Labs     10/20/21  1508   WBC 6.5   HGB 15.9   HCT 48.5        Recent Labs     10/20/21  1508      K 4.0   CL 96*   CO2 32   BUN 12   CREATININE 0.9   CALCIUM 8.2*     Recent Labs     10/20/21  1508   AST 27   ALT 28   BILITOT 0.5   ALKPHOS 62     Microbiology:    Positive Covid test on 10/20    Urinalysis:      Lab Results   Component Value Date    BLOODU neg 05/14/2021    SPECGRAV 1.025 05/14/2021    GLUCOSEU neg 05/14/2021       Radiology:  XR CHEST PORTABLE    Result Date: 10/20/2021  PROCEDURE: XR CHEST PORTABLE CLINICAL INFORMATION: hypoxia, cough . TECHNIQUE: Portable upright COMPARISON: No prior study. FINDINGS: Heart size normal mediastinum is not widened. Multifocal airspace infiltrates in both lungs. No definite effusion. Pulmonary vessels are not congested. EKG leads overlie the chest.     Bilateral airspace infiltrates. **This report has been created using voice recognition software. It may contain minor errors which are inherent in voice recognition technology. ** Final report electronically signed by Dr. Gayathri Barrera on 10/20/2021 3:13 PM      DVT prophylaxis: [x] Lovenox                                 [] SCDs                                 [] SQ Heparin                                 [] Encourage ambulation           [] Already on Anticoagulation     Code Status: Full Code    PT/OT Eval Status: added    Tele:   [x] yes sinus rhythm heart rate 62             [] no    Active Hospital Problems    Diagnosis Date Noted    COVID-19 virus detected [U07.1] 10/20/2021       Electronically signed by JANUSZ Graham CNP on 10/21/2021 at 8:54 AM

## 2021-10-21 NOTE — ACP (ADVANCE CARE PLANNING)
Advance Care Planning     Advance Care Planning Inpatient Note  [facility] Department    Today's Date: 10/21/2021  Unit: [facility]    Received request from IDT Member. Upon review of chart and communication with care team, patient's decision making abilities are not in question. . Patient was/were present in the room during visit. Goals of ACP Conversation:  Discuss advance care planning documents    Health Care Decision Makers:       Primary Decision Maker: Joaquínrolf Sidra Goodwin - [phone]    Summary:  Verified Documents  Completed New Documents    Advance Care Planning Documents (Patient Wishes):  Healthcare Power of /Advance Directive Appointment of Health Care Agent  Living Will/Advance Directive     Assessment:  Advanced directives Consult: Pt got covid and at the covid floor. He was transferred from a covid floor to [facility] ICU and would be intubated sometime today, nurse said. Advanced directives were completed and filed and a copy sent to the medical records. Pt was encouraged and was anointed. He was not giving up.      Interventions:  Assisted in the completion of documents according to patient's wishes at this time    Care Preferences Communicated:   No    Outcomes/Plan:  ACP Discussion: Completed    Electronically signed by Uma Andre, [id number] on 10/21/2021 at 1:18 PM

## 2021-10-21 NOTE — CONSULTS
CRITICAL CARE CONSULT NOTE      Patient:  Fidencio Amaral    Unit/Bed:3A-05/005-A  YOB: 1970  MRN: [id number]   PCP: Bernice Chaves MD  Date of Admission: 10/20/2021  Chief Complaint:- Acute hypoxic respiratory failure     Assessment and Plan:      Acute hypoxic respiratory failure: Patient progressed from nasal cannula to high flow to BiPAP. Goal to maintain high flow during daytime hours and BiPAP at night. I did speak with patient about possible need for intubation he is agreeable. COVID-19 pneumonia: Worsening overnight. Remdesivir and dexamethasone  Obesity: Known. Increased risk factors intubation. BMI 48.82    INITIAL H AND P AND ICU COURSE:  Tim Gordon is a 61-year-old white male who presented to [facility] 10/20/2021 with complaints of shortness of breath. Past medical history reformed smoker and obesity. Per report patient said he started to develop symptoms on the 16th with fatigue nausea weakness and decreased oral intake. He also developed shortness of breath and was sent to the emergency room. On room air he was satting 87%. He was placed on 2 L nasal cannula. COVID-19 screen was positive. He had not been vaccinated. Chest x-ray showed no infiltrates. He was admitted to stepdown. On 10/21/2021 patient had increased oxygen needs overnight. Progressed to BIPAP, he was admitted to the ICU. Past Medical History: per HPI  Family History: Mother: Cancer  Social History: Reformed smoker, social alcohol use, denies drug use. Review of Systems   Constitutional: Positive for fatigue. Negative for fever. HENT: Negative for sore throat and trouble swallowing. Eyes: Negative for photophobia and visual disturbance. Respiratory: Positive for shortness of breath. Negative for chest tightness and wheezing. Cardiovascular: Negative for chest pain and leg swelling. Gastrointestinal: Negative for abdominal pain, nausea and vomiting.    Endocrine: Negative for polydipsia and polyphagia. Genitourinary: Negative for decreased urine volume and flank pain. Musculoskeletal: Negative for back pain and neck pain. Skin: Negative for color change, pallor and rash. Allergic/Immunologic: Negative for food allergies. Neurological: Negative for dizziness, seizures and weakness. Hematological: Negative for adenopathy. Psychiatric/Behavioral: Negative for agitation and confusion. The patient is not nervous/anxious. Scheduled Meds:   dexamethasone  20 mg IntraVENous Daily    sodium chloride flush  5-40 mL IntraVENous 2 times per day    enoxaparin  40 mg SubCUTAneous Q12H    Vitamin D  2,000 Units Oral Daily    ascorbic acid  500 mg Oral Daily    zinc sulfate  50 mg Oral Daily    remdesivir IVPB  100 mg IntraVENous Q24H     Continuous Infusions:   sodium chloride         PHYSICAL EXAMINATION:  T:  96.3.  P:  61. RR:  20. B/P:  118/73. FiO2:  100. O2 Sat:  94.  I/O:  118/73  Body mass index is 48.82 kg/m². General:   Acute on chronically ill-appearing white male  HEENT:  normocephalic and atraumatic. No scleral icterus. PERR  Neck: supple. No Thyromegaly. Lungs: Diminished to auscultation. No retractions  Cardiac: RRR. No JVD. Abdomen: soft. Nontender. Extremities:  No clubbing, cyanosis, or edema x 4. Vasculature: capillary refill < 3 seconds. Palpable dorsalis pedis pulses. Skin:  warm and dry. Psych:  Alert and oriented x3. Affect appropriate  Lymph:  No supraclavicular adenopathy. Neurologic:  No focal deficit. No seizures. Data: (All radiographs, tracings, PFTs, and imaging are personally viewed and interpreted unless otherwise noted). Sodium 137, potassium 4.0, chloride 96, CO2 1232 BUN 12, creatinine 09, anion gap 9.0, glucose 118.   WBC 6.5, hemoglobin 15.9, hematocrit 48.5, platelet count 967  Telemetry shows normal sinus rhythm  COVID-19 positive 10/20/2021  Chest x-ray 10/20/2021 reports bilateral airspace infiltrates  EKG 10/20/2021 reports normal sinus rhythm        Meets Continued ICU Level Care Criteria:    [x] Yes   [] No - Transfer Planned to listed location:  [] HOSPITALIST CONTACTED-      Case and plan discussed with Dr. Sylvia Nova. Electronically signed by Luis Cunningham. JANUSZ Mercado - [facility]  CRITICAL CARE SPECIALIST  Patient seen by me. Case discussed with nurse practitioner. I spoke with patient on risks for intubation including aspiration. I explained microaspiration as well. Patient does agree for intubation if required. Patient will avoid substances that relax the lower esophageal sphincter. He will not lay flat. .  Italicized font represents changes to the note made by me. CC time 35 minutes. Time was discontiguous. Time does not include procedures. Time does include my direct assessment of the patient and coordination of care.   Electronically signed by Dominic Ludwig MD on 10/22/2021 at 10:39 AM

## 2021-10-22 ENCOUNTER — APPOINTMENT (OUTPATIENT)
Dept: GENERAL RADIOLOGY | Age: 51
DRG: 177 | End: 2021-10-22
Payer: COMMERCIAL

## 2021-10-22 LAB
ALBUMIN SERPL-MCNC: 3.5 G/DL (ref 3.5–5.1)
ALP BLD-CCNC: 60 U/L (ref 38–126)
ALT SERPL-CCNC: 24 U/L (ref 11–66)
ANION GAP SERPL CALCULATED.3IONS-SCNC: 12 MEQ/L (ref 8–16)
AST SERPL-CCNC: 23 U/L (ref 5–40)
BASOPHILS # BLD: 0.3 %
BASOPHILS ABSOLUTE: 0 THOU/MM3 (ref 0–0.1)
BILIRUB SERPL-MCNC: 0.4 MG/DL (ref 0.3–1.2)
BILIRUBIN DIRECT: < 0.2 MG/DL (ref 0–0.3)
BUN BLDV-MCNC: 20 MG/DL (ref 7–22)
C-REACTIVE PROTEIN: 3.15 MG/DL (ref 0–1)
CALCIUM SERPL-MCNC: 8.8 MG/DL (ref 8.5–10.5)
CHLORIDE BLD-SCNC: 100 MEQ/L (ref 98–111)
CO2: 28 MEQ/L (ref 23–33)
CREAT SERPL-MCNC: 0.8 MG/DL (ref 0.4–1.2)
EOSINOPHIL # BLD: 0 %
EOSINOPHILS ABSOLUTE: 0 THOU/MM3 (ref 0–0.4)
ERYTHROCYTE [DISTWIDTH] IN BLOOD BY AUTOMATED COUNT: 13.7 % (ref 11.5–14.5)
ERYTHROCYTE [DISTWIDTH] IN BLOOD BY AUTOMATED COUNT: 49.1 FL (ref 35–45)
GFR SERPL CREATININE-BSD FRML MDRD: > 90 ML/MIN/1.73M2
GLUCOSE BLD-MCNC: 183 MG/DL (ref 70–108)
HCT VFR BLD CALC: 53.7 % (ref 42–52)
HEMOGLOBIN: 16.8 GM/DL (ref 14–18)
IMMATURE GRANS (ABS): 0.03 THOU/MM3 (ref 0–0.07)
IMMATURE GRANULOCYTES: 0.4 %
LYMPHOCYTES # BLD: 9 %
LYMPHOCYTES ABSOLUTE: 0.7 THOU/MM3 (ref 1–4.8)
MCH RBC QN AUTO: 30.3 PG (ref 26–33)
MCHC RBC AUTO-ENTMCNC: 31.3 GM/DL (ref 32.2–35.5)
MCV RBC AUTO: 96.9 FL (ref 80–94)
MONOCYTES # BLD: 5.5 %
MONOCYTES ABSOLUTE: 0.4 THOU/MM3 (ref 0.4–1.3)
NUCLEATED RED BLOOD CELLS: 0 /100 WBC
PLATELET # BLD: 239 THOU/MM3 (ref 130–400)
PMV BLD AUTO: 9.3 FL (ref 9.4–12.4)
POTASSIUM SERPL-SCNC: 4.7 MEQ/L (ref 3.5–5.2)
RBC # BLD: 5.54 MILL/MM3 (ref 4.7–6.1)
SEG NEUTROPHILS: 84.8 %
SEGMENTED NEUTROPHILS ABSOLUTE COUNT: 6.4 THOU/MM3 (ref 1.8–7.7)
SODIUM BLD-SCNC: 140 MEQ/L (ref 135–145)
TOTAL PROTEIN: 6.7 G/DL (ref 6.1–8)
WBC # BLD: 7.6 THOU/MM3 (ref 4.8–10.8)

## 2021-10-22 PROCEDURE — 82248 BILIRUBIN DIRECT: CPT

## 2021-10-22 PROCEDURE — 85025 COMPLETE CBC W/AUTO DIFF WBC: CPT

## 2021-10-22 PROCEDURE — APPSS180 APP SPLIT SHARED TIME > 60 MINUTES: Performed by: NURSE PRACTITIONER

## 2021-10-22 PROCEDURE — 2500000003 HC RX 250 WO HCPCS: Performed by: NURSE PRACTITIONER

## 2021-10-22 PROCEDURE — 2700000000 HC OXYGEN THERAPY PER DAY

## 2021-10-22 PROCEDURE — 99232 SBSQ HOSP IP/OBS MODERATE 35: CPT | Performed by: INTERNAL MEDICINE

## 2021-10-22 PROCEDURE — 6360000002 HC RX W HCPCS: Performed by: NURSE PRACTITIONER

## 2021-10-22 PROCEDURE — 6370000000 HC RX 637 (ALT 250 FOR IP): Performed by: NURSE PRACTITIONER

## 2021-10-22 PROCEDURE — 94761 N-INVAS EAR/PLS OXIMETRY MLT: CPT

## 2021-10-22 PROCEDURE — 2580000003 HC RX 258: Performed by: NURSE PRACTITIONER

## 2021-10-22 PROCEDURE — 71045 X-RAY EXAM CHEST 1 VIEW: CPT

## 2021-10-22 PROCEDURE — 2060000000 HC ICU INTERMEDIATE R&B

## 2021-10-22 PROCEDURE — 80053 COMPREHEN METABOLIC PANEL: CPT

## 2021-10-22 PROCEDURE — 36415 COLL VENOUS BLD VENIPUNCTURE: CPT

## 2021-10-22 PROCEDURE — 86140 C-REACTIVE PROTEIN: CPT

## 2021-10-22 RX ADMIN — REMDESIVIR 100 MG: 100 INJECTION, POWDER, LYOPHILIZED, FOR SOLUTION INTRAVENOUS at 21:39

## 2021-10-22 RX ADMIN — GUAIFENESIN AND DEXTROMETHORPHAN 5 ML: 100; 10 SYRUP ORAL at 21:39

## 2021-10-22 RX ADMIN — ENOXAPARIN SODIUM 40 MG: 40 INJECTION SUBCUTANEOUS at 09:01

## 2021-10-22 RX ADMIN — DEXAMETHASONE SODIUM PHOSPHATE 20 MG: 4 INJECTION, SOLUTION INTRAMUSCULAR; INTRAVENOUS at 09:01

## 2021-10-22 RX ADMIN — SODIUM CHLORIDE, PRESERVATIVE FREE 10 ML: 5 INJECTION INTRAVENOUS at 21:39

## 2021-10-22 RX ADMIN — GUAIFENESIN AND DEXTROMETHORPHAN 5 ML: 100; 10 SYRUP ORAL at 09:01

## 2021-10-22 RX ADMIN — ENOXAPARIN SODIUM 40 MG: 40 INJECTION SUBCUTANEOUS at 21:38

## 2021-10-22 ASSESSMENT — ENCOUNTER SYMPTOMS
SORE THROAT: 0
ABDOMINAL PAIN: 0
SHORTNESS OF BREATH: 1
BACK PAIN: 0
CHEST TIGHTNESS: 0
TROUBLE SWALLOWING: 0
NAUSEA: 0
WHEEZING: 0
COLOR CHANGE: 0
VOMITING: 0
PHOTOPHOBIA: 0

## 2021-10-22 ASSESSMENT — PAIN SCALES - GENERAL
PAINLEVEL_OUTOF10: 0

## 2021-10-22 ASSESSMENT — PAIN SCALES - WONG BAKER
WONGBAKER_NUMERICALRESPONSE: 0

## 2021-10-22 NOTE — PROGRESS NOTES
CRITICAL CARE PROGRESS NOTE      Patient:  Amaury Pipe    Unit/Bed:3A-05/005-A  YOB: 1970  MRN: [id number]   PCP: William Paniagua MD  Date of Admission: 10/20/2021  Chief Complaint:- Acute hypoxic respiratory failure     Assessment and Plan:      1. Acute hypoxic respiratory failure: Patient progressed from nasal cannula to high flow to BiPAP. Goal to maintain high flow during daytime hours and BiPAP at night. I did speak with patient about possible need for intubation he is agreeable. 10/22 patient improved, will transfer to stepdown   2. COVID-19 pneumonia: improved on chest xray. Remdesivir and dexamethasone  3. Obesity: Known. Increased risk factors intubation. BMI 48.82  4. Elevated glucose: Sliding scale insulin. A1c pending. Could be secondary to steroids. Monitor.     INITIAL H AND P AND ICU COURSE:  Maria C Hernandez is a 57-year-old white male who presented to [facility] 10/20/2021 with complaints of shortness of breath. Past medical history reformed smoker and obesity. Per report patient said he started to develop symptoms on the 16th with fatigue nausea weakness and decreased oral intake. He also developed shortness of breath and was sent to the emergency room. On room air he was satting 87%. He was placed on 2 L nasal cannula. COVID-19 screen was positive. He had not been vaccinated. Chest x-ray showed no infiltrates. He was admitted to stepdown. On 10/21/2021 patient had increased oxygen needs overnight. Progressed to BIPAP, he was admitted to the ICU.     Past Medical History: per HPI  Family History: Mother: Cancer  Social History: Reformed smoker, social alcohol use, denies drug use. Review of Systems   Constitutional: Negative for fatigue and fever. HENT: Negative for sore throat and trouble swallowing. Eyes: Negative for photophobia and visual disturbance. Respiratory: Positive for shortness of breath.  Negative for chest tightness and wheezing. Cardiovascular: Negative for chest pain and leg swelling. Gastrointestinal: Negative for abdominal pain, nausea and vomiting. Endocrine: Negative for polydipsia and polyphagia. Genitourinary: Negative for decreased urine volume and flank pain. Musculoskeletal: Negative for back pain and neck pain. Skin: Negative for color change, pallor and rash. Allergic/Immunologic: Negative for food allergies. Neurological: Negative for dizziness, weakness and numbness. Hematological: Negative for adenopathy. Psychiatric/Behavioral: Negative for agitation and confusion. The patient is not nervous/anxious. Scheduled Meds:   dexamethasone  20 mg IntraVENous Daily    sodium chloride flush  5-40 mL IntraVENous 2 times per day    enoxaparin  40 mg SubCUTAneous Q12H    remdesivir IVPB  100 mg IntraVENous Q24H     Continuous Infusions:   sodium chloride         PHYSICAL EXAMINATION:  T:  98.8. P:  63. RR:  24. B/P:  122/66. FiO2:  100. O2 Sat:  98.  I/O:  60/775  Body mass index is 48.82 kg/m². GCS: 15   General:   HEENT:  normocephalic and atraumatic. No scleral icterus. PERR  Neck: supple. No Thyromegaly. Lungs: clear to auscultation. No retractions  Cardiac: RRR. No JVD. Abdomen: soft. Nontender. Extremities:  No clubbing, cyanosis, or edema x 4. Vasculature: capillary refill < 3 seconds. Palpable dorsalis pedis pulses. Skin:  warm and dry. Psych:  Alert and oriented x3. Affect appropriate  Lymph:  No supraclavicular adenopathy. Neurologic:  No focal deficit. No seizures. Data: (All radiographs, tracings, PFTs, and imaging are personally viewed and interpreted unless otherwise noted).     Sodium 140, potassium 4.7, chloride 100, CO2 28, BUN 20, creatinine 0.8, anion gap 12.0, glucose 183   WBC 7.6, hemoglobin 16.8, hematocrit 57.7, platelet count 836   Telemetry shows NSR    Chest x-ray 10/22/2021 reports bilateral consolidation with slight improvement      Meets Continued ICU Level Care Criteria:    [] Yes   [x] No - Transfer Planned to listed location: 6A  [x] HOSPITALIST CONTACTEDBryn Queen CNP    Case and plan discussed with Dr. Raymond Valle. Electronically signed by [place] JANUSZ Cai - CNP  CRITICAL CARE SPECIALIST  Patient seen by me. Case discussed with nurse practitioner. Patient transitioning back to medical floor for ongoing management of Covid with high flow oxygen requirements. .  Italicized font represents changes to the note made by me. CC time 35 minutes. Time was discontiguous. Time does not include procedures. Time does include my direct assessment of the patient and coordination of care.   Electronically signed by Sarah Ross MD on 10/22/2021 at 3:55 PM

## 2021-10-22 NOTE — CARE COORDINATION
10/22/21, 12:45 PM EDT    DISCHARGE ON GOING EVALUATION    [address] day: 2  Location: -05/005-A Reason for admit: COVID-19 virus detected [U07.1]   Procedure:   10/22 CXR: Bilateral consolidation with slight improvement on left since prior study    Barriers to Discharge: Self-prones. Continues on bipap vs HFNC. Currently on HFNC, 60L, 100% FIO2, sats 92%. Afebrile. SB 50's-NSR. Ox4. IS. PT/OT. IV decadron, lovenox. CRP 3.15. PCP: Av Carr MD  Readmission Risk Score: 7%  Patient Goals/Plan/Treatment Preferences: From home alone. Denies needs, declines HH. Monitor for possible needs as course progresses.

## 2021-10-23 LAB
ALBUMIN SERPL-MCNC: 3.4 G/DL (ref 3.5–5.1)
ALP BLD-CCNC: 56 U/L (ref 38–126)
ALT SERPL-CCNC: 22 U/L (ref 11–66)
ANION GAP SERPL CALCULATED.3IONS-SCNC: 8 MEQ/L (ref 8–16)
AST SERPL-CCNC: 19 U/L (ref 5–40)
ATYPICAL LYMPHOCYTES: ABNORMAL %
AVERAGE GLUCOSE: 153 MG/DL (ref 70–126)
BASOPHILS # BLD: 0.2 %
BASOPHILS ABSOLUTE: 0 THOU/MM3 (ref 0–0.1)
BILIRUB SERPL-MCNC: 0.3 MG/DL (ref 0.3–1.2)
BILIRUBIN DIRECT: < 0.2 MG/DL (ref 0–0.3)
BUN BLDV-MCNC: 21 MG/DL (ref 7–22)
C-REACTIVE PROTEIN: 1.15 MG/DL (ref 0–1)
CALCIUM SERPL-MCNC: 8.5 MG/DL (ref 8.5–10.5)
CHLORIDE BLD-SCNC: 101 MEQ/L (ref 98–111)
CO2: 33 MEQ/L (ref 23–33)
CREAT SERPL-MCNC: 0.6 MG/DL (ref 0.4–1.2)
EOSINOPHIL # BLD: 0 %
EOSINOPHILS ABSOLUTE: 0 THOU/MM3 (ref 0–0.4)
ERYTHROCYTE [DISTWIDTH] IN BLOOD BY AUTOMATED COUNT: 14 % (ref 11.5–14.5)
ERYTHROCYTE [DISTWIDTH] IN BLOOD BY AUTOMATED COUNT: 49 FL (ref 35–45)
GFR SERPL CREATININE-BSD FRML MDRD: > 90 ML/MIN/1.73M2
GLUCOSE BLD-MCNC: 182 MG/DL (ref 70–108)
GLUCOSE BLD-MCNC: 184 MG/DL (ref 70–108)
GLUCOSE BLD-MCNC: 188 MG/DL (ref 70–108)
GLUCOSE BLD-MCNC: 190 MG/DL (ref 70–108)
HBA1C MFR BLD: 7.1 % (ref 4.4–6.4)
HCT VFR BLD CALC: 51.6 % (ref 42–52)
HEMOGLOBIN: 16.5 GM/DL (ref 14–18)
IMMATURE GRANS (ABS): 0.09 THOU/MM3 (ref 0–0.07)
IMMATURE GRANULOCYTES: 0.8 %
LYMPHOCYTES # BLD: 9.6 %
LYMPHOCYTES ABSOLUTE: 1.1 THOU/MM3 (ref 1–4.8)
MCH RBC QN AUTO: 30.3 PG (ref 26–33)
MCHC RBC AUTO-ENTMCNC: 32 GM/DL (ref 32.2–35.5)
MCV RBC AUTO: 94.7 FL (ref 80–94)
MONOCYTES # BLD: 7.2 %
MONOCYTES ABSOLUTE: 0.8 THOU/MM3 (ref 0.4–1.3)
NUCLEATED RED BLOOD CELLS: 0 /100 WBC
PLATELET # BLD: 303 THOU/MM3 (ref 130–400)
PLATELET ESTIMATE: ADEQUATE
PMV BLD AUTO: 9.3 FL (ref 9.4–12.4)
POTASSIUM SERPL-SCNC: 4.2 MEQ/L (ref 3.5–5.2)
RBC # BLD: 5.45 MILL/MM3 (ref 4.7–6.1)
SCAN OF BLOOD SMEAR: NORMAL
SEG NEUTROPHILS: 82.2 %
SEGMENTED NEUTROPHILS ABSOLUTE COUNT: 9.4 THOU/MM3 (ref 1.8–7.7)
SODIUM BLD-SCNC: 142 MEQ/L (ref 135–145)
TOTAL PROTEIN: 6.2 G/DL (ref 6.1–8)
WBC # BLD: 11.4 THOU/MM3 (ref 4.8–10.8)

## 2021-10-23 PROCEDURE — 2700000000 HC OXYGEN THERAPY PER DAY

## 2021-10-23 PROCEDURE — 80053 COMPREHEN METABOLIC PANEL: CPT

## 2021-10-23 PROCEDURE — 6360000002 HC RX W HCPCS: Performed by: NURSE PRACTITIONER

## 2021-10-23 PROCEDURE — 36415 COLL VENOUS BLD VENIPUNCTURE: CPT

## 2021-10-23 PROCEDURE — 83036 HEMOGLOBIN GLYCOSYLATED A1C: CPT

## 2021-10-23 PROCEDURE — 6370000000 HC RX 637 (ALT 250 FOR IP): Performed by: NURSE PRACTITIONER

## 2021-10-23 PROCEDURE — 99232 SBSQ HOSP IP/OBS MODERATE 35: CPT | Performed by: NURSE PRACTITIONER

## 2021-10-23 PROCEDURE — 82948 REAGENT STRIP/BLOOD GLUCOSE: CPT

## 2021-10-23 PROCEDURE — 86140 C-REACTIVE PROTEIN: CPT

## 2021-10-23 PROCEDURE — 2060000000 HC ICU INTERMEDIATE R&B

## 2021-10-23 PROCEDURE — 2500000003 HC RX 250 WO HCPCS: Performed by: NURSE PRACTITIONER

## 2021-10-23 PROCEDURE — 85025 COMPLETE CBC W/AUTO DIFF WBC: CPT

## 2021-10-23 PROCEDURE — 94761 N-INVAS EAR/PLS OXIMETRY MLT: CPT

## 2021-10-23 PROCEDURE — 82248 BILIRUBIN DIRECT: CPT

## 2021-10-23 PROCEDURE — 2580000003 HC RX 258: Performed by: NURSE PRACTITIONER

## 2021-10-23 RX ORDER — BACITRACIN, NEOMYCIN, POLYMYXIN B 400; 3.5; 5 [USP'U]/G; MG/G; [USP'U]/G
OINTMENT TOPICAL 2 TIMES DAILY
Status: DISCONTINUED | OUTPATIENT
Start: 2021-10-23 | End: 2021-10-27 | Stop reason: HOSPADM

## 2021-10-23 RX ORDER — DEXTROSE MONOHYDRATE 25 G/50ML
12.5 INJECTION, SOLUTION INTRAVENOUS PRN
Status: DISCONTINUED | OUTPATIENT
Start: 2021-10-23 | End: 2021-10-27 | Stop reason: HOSPADM

## 2021-10-23 RX ORDER — DEXTROSE MONOHYDRATE 50 MG/ML
100 INJECTION, SOLUTION INTRAVENOUS PRN
Status: DISCONTINUED | OUTPATIENT
Start: 2021-10-23 | End: 2021-10-27 | Stop reason: HOSPADM

## 2021-10-23 RX ORDER — NICOTINE POLACRILEX 4 MG
15 LOZENGE BUCCAL PRN
Status: DISCONTINUED | OUTPATIENT
Start: 2021-10-23 | End: 2021-10-27 | Stop reason: HOSPADM

## 2021-10-23 RX ADMIN — SODIUM CHLORIDE, PRESERVATIVE FREE 10 ML: 5 INJECTION INTRAVENOUS at 21:44

## 2021-10-23 RX ADMIN — BACITRACIN ZINC NEOMYCIN SULFATE POLYMYXIN B SULFATE: 400; 3.5; 5 OINTMENT TOPICAL at 21:33

## 2021-10-23 RX ADMIN — SODIUM CHLORIDE, PRESERVATIVE FREE 10 ML: 5 INJECTION INTRAVENOUS at 09:21

## 2021-10-23 RX ADMIN — DEXAMETHASONE SODIUM PHOSPHATE 20 MG: 4 INJECTION, SOLUTION INTRAMUSCULAR; INTRAVENOUS at 09:21

## 2021-10-23 RX ADMIN — GUAIFENESIN AND DEXTROMETHORPHAN 5 ML: 100; 10 SYRUP ORAL at 21:33

## 2021-10-23 RX ADMIN — ENOXAPARIN SODIUM 40 MG: 40 INJECTION SUBCUTANEOUS at 21:33

## 2021-10-23 RX ADMIN — ENOXAPARIN SODIUM 40 MG: 40 INJECTION SUBCUTANEOUS at 09:21

## 2021-10-23 RX ADMIN — REMDESIVIR 100 MG: 100 INJECTION, POWDER, LYOPHILIZED, FOR SOLUTION INTRAVENOUS at 21:33

## 2021-10-23 RX ADMIN — INSULIN LISPRO 1 UNITS: 100 INJECTION, SOLUTION INTRAVENOUS; SUBCUTANEOUS at 17:23

## 2021-10-23 ASSESSMENT — PAIN SCALES - GENERAL: PAINLEVEL_OUTOF10: 0

## 2021-10-23 NOTE — PROGRESS NOTES
Hospitalist Progress Note    Patient:  Kj Patricia      Unit/Bed:6A-14/014-A    YOB: 1970    MRN: [id number]       Acct: [de-identified]     PCP: Av Carr MD    Date of Admission: 10/20/2021    Assessment/Plan:    1. Pneumonia, bilaterally secondary to COVID-19 infection--Decadron 6 mg p.o. daily~increased to 20 mg daily for 5 days on 10/21; Remdesivir added 10/20; CRP at 1.15 today  2. Acute hypoxic respiratory failure--on high flow oxygen with 83% FiO2 and 60 L satting 96%; plan to wean even further today and monitor closely; encourage Acapella and incentive spirometry  3. Hyperglycemia--hemoglobin A1c noted to be 7.1, patient's glucose on admission was 118 however patient has been on steroids and was increased to high-dose steroids on October 21, will monitor and patient likely needs outpatient follow-up for further evaluation; will add low-dose sliding scale since the patient is on steroids with the hypoglycemia protocol  4. Morbid obesity with BMI 48.82    Expected discharge date:  TBD    Disposition:    [x] Home       [] TCU       [] Rehab       [] Psych       [] SNF       [] [place]       [] Other-    Chief Complaint: Shortness of breath    Hospital Course:  52 y.o. male who presented to [facility] with shortness of breath; patient denies any significant past medical history; he states on October 16 he developed fatigue, nausea, weakness along with decreased oral intake, he subsequently developed shortness of breath and a cough; he was sent to the emergency department for evaluation today and initial O2 sat was 87% on room air; he was placed on 2 L with improvement of his saturations; his COVID-19 screen was positive; patient has not been vaccinated; I added a CRP level; laboratory studies are nonrevealing, chest x-ray shows bilateral infiltrates, patient is being admitted to the hospital service for further care and evaluation.     10/21--> patient desatted to 84 to 87% on 6 L, was placed on high flow oxygen and sats were 84 to 85% so BiPAP orders were placed in current settings on BiPAP 20/10 with 100% oxygen satting 94 to 95%; his current respiratory rate is at 20; I sent a message to the ICU NP [facility] and discussed with her on the phone and plan is to transfer to [address] [place] [address] for close monitoring and possible intubation    10/23--> patient transferred out of [facility] ICU yesterday after close monitoring, he looks so much better and acts a lot better, he is speaking in complete sentences in no respiratory distress noted; patient is hemodynamically stable, on high flow 83% FiO2 and 60 L satting 96%    Subjective (past 24 hours): Offers no complaints, relates to feeling so much better, able to eat now    Medications:  Reviewed    Infusion Medications    sodium chloride       Scheduled Medications    dexamethasone  20 mg IntraVENous Daily    sodium chloride flush  5-40 mL IntraVENous 2 times per day    enoxaparin  40 mg SubCUTAneous Q12H    remdesivir IVPB  100 mg IntraVENous Q24H     PRN Meds: sodium chloride flush, sodium chloride, ondansetron **OR** ondansetron, polyethylene glycol, acetaminophen **OR** acetaminophen, guaiFENesin-dextromethorphan, sodium chloride      Intake/Output Summary (Last 24 hours) at 10/23/2021 0631  Last data filed at 10/23/2021 0602  Gross per 24 hour   Intake 300 ml   Output 375 ml   Net -75 ml       Diet:  ADULT DIET; Regular    Exam:  BP (!) 96/54   Pulse 65   Temp 96.7 °F (35.9 °C) (Axillary)   Resp 16   Ht 5' 11\" (1.803 m)   Wt (!) 350 lb (158.8 kg)   SpO2 94%   BMI 48.82 kg/m²     General appearance: Appears stated age and cooperative. HEENT: Pupils equal, round, and reactive to light. Conjunctivae/corneas clear. Neck: Supple, with full range of motion. No jugular venous distention. Trachea midline. Respiratory:  Normal respiratory effort.   Clear to auscultation  Cardiovascular: Regular rate and rhythm with normal S1/S2 without murmurs, rubs or gallops. Abdomen: Soft, non-tender, non-distended with normal bowel sounds. Obese  Musculoskeletal: passive and active ROM x 4 extremities. Skin: Skin color, texture, turgor normal.  No rashes or lesions. Neurologic:  Neurovascularly intact without any focal sensory/motor deficits. Cranial nerves: II-XII intact, grossly non-focal.  Psychiatric: Alert and oriented, thought content appropriate  Capillary Refill: Brisk,< 3 seconds   Peripheral Pulses: +2 palpable, equal bilaterally       Labs:   Recent Labs     10/20/21  1508 10/22/21  0409   WBC 6.5 7.6   HGB 15.9 16.8   HCT 48.5 53.7*    239     Recent Labs     10/20/21  1508 10/22/21  0409    140   K 4.0 4.7   CL 96* 100   CO2 32 28   BUN 12 20   CREATININE 0.9 0.8   CALCIUM 8.2* 8.8     Recent Labs     10/20/21  1508 10/21/21  0904 10/22/21  0409   AST 27 25 23   ALT 28 27 24   BILIDIR  --  <0.2 <0.2   BILITOT 0.5 0.5 0.4   ALKPHOS 57 62 60     Microbiology:    Positive Covid test on 10/20    Urinalysis:      Lab Results   Component Value Date    BLOODU neg 05/14/2021    SPECGRAV 1.025 05/14/2021    GLUCOSEU neg 05/14/2021       Radiology:  XR CHEST PORTABLE    Result Date: 10/20/2021  PROCEDURE: XR CHEST PORTABLE CLINICAL INFORMATION: hypoxia, cough . TECHNIQUE: Portable upright COMPARISON: No prior study. FINDINGS: Heart size normal mediastinum is not widened. Multifocal airspace infiltrates in both lungs. No definite effusion. Pulmonary vessels are not congested. EKG leads overlie the chest.     Bilateral airspace infiltrates. **This report has been created using voice recognition software. It may contain minor errors which are inherent in voice recognition technology. ** Final report electronically signed by Dr. Taylor Seen on 10/20/2021 3:13 PM      DVT prophylaxis: [x] Lovenox                                 [] SCDs                                 [] SQ Heparin                                 [] Encourage ambulation           [] Already on Anticoagulation     Code Status: Full Code    PT/OT Eval Status: Evaluating    Tele:   [x] yes sinus rhythm heart rate 65             [] no    Active Hospital Problems    Diagnosis Date Noted    Hypoxemia [R09.02]     COVID-19 virus detected [U07.1] 10/20/2021       Electronically signed by JANUSZ Melara CNP on 10/23/2021 at 6:31 AM

## 2021-10-24 LAB
ALBUMIN SERPL-MCNC: 3.2 G/DL (ref 3.5–5.1)
ALP BLD-CCNC: 51 U/L (ref 38–126)
ALT SERPL-CCNC: 26 U/L (ref 11–66)
AST SERPL-CCNC: 23 U/L (ref 5–40)
BILIRUB SERPL-MCNC: 0.4 MG/DL (ref 0.3–1.2)
BILIRUBIN DIRECT: < 0.2 MG/DL (ref 0–0.3)
GLUCOSE BLD-MCNC: 130 MG/DL (ref 70–108)
GLUCOSE BLD-MCNC: 192 MG/DL (ref 70–108)
GLUCOSE BLD-MCNC: 270 MG/DL (ref 70–108)
TOTAL PROTEIN: 6.1 G/DL (ref 6.1–8)

## 2021-10-24 PROCEDURE — 6360000002 HC RX W HCPCS: Performed by: NURSE PRACTITIONER

## 2021-10-24 PROCEDURE — 2500000003 HC RX 250 WO HCPCS: Performed by: NURSE PRACTITIONER

## 2021-10-24 PROCEDURE — 94761 N-INVAS EAR/PLS OXIMETRY MLT: CPT

## 2021-10-24 PROCEDURE — 94669 MECHANICAL CHEST WALL OSCILL: CPT

## 2021-10-24 PROCEDURE — 2060000000 HC ICU INTERMEDIATE R&B

## 2021-10-24 PROCEDURE — 2700000000 HC OXYGEN THERAPY PER DAY

## 2021-10-24 PROCEDURE — 2580000003 HC RX 258: Performed by: NURSE PRACTITIONER

## 2021-10-24 PROCEDURE — 36415 COLL VENOUS BLD VENIPUNCTURE: CPT

## 2021-10-24 PROCEDURE — 80076 HEPATIC FUNCTION PANEL: CPT

## 2021-10-24 PROCEDURE — 82948 REAGENT STRIP/BLOOD GLUCOSE: CPT

## 2021-10-24 PROCEDURE — 99232 SBSQ HOSP IP/OBS MODERATE 35: CPT | Performed by: NURSE PRACTITIONER

## 2021-10-24 RX ADMIN — ENOXAPARIN SODIUM 40 MG: 40 INJECTION SUBCUTANEOUS at 20:18

## 2021-10-24 RX ADMIN — REMDESIVIR 100 MG: 100 INJECTION, POWDER, LYOPHILIZED, FOR SOLUTION INTRAVENOUS at 22:24

## 2021-10-24 RX ADMIN — BACITRACIN ZINC NEOMYCIN SULFATE POLYMYXIN B SULFATE: 400; 3.5; 5 OINTMENT TOPICAL at 20:18

## 2021-10-24 RX ADMIN — INSULIN LISPRO 1 UNITS: 100 INJECTION, SOLUTION INTRAVENOUS; SUBCUTANEOUS at 17:34

## 2021-10-24 RX ADMIN — BACITRACIN ZINC NEOMYCIN SULFATE POLYMYXIN B SULFATE: 400; 3.5; 5 OINTMENT TOPICAL at 09:09

## 2021-10-24 RX ADMIN — ENOXAPARIN SODIUM 40 MG: 40 INJECTION SUBCUTANEOUS at 09:09

## 2021-10-24 RX ADMIN — SODIUM CHLORIDE, PRESERVATIVE FREE 10 ML: 5 INJECTION INTRAVENOUS at 09:09

## 2021-10-24 RX ADMIN — DEXAMETHASONE SODIUM PHOSPHATE 20 MG: 4 INJECTION, SOLUTION INTRAMUSCULAR; INTRAVENOUS at 09:09

## 2021-10-24 RX ADMIN — SODIUM CHLORIDE, PRESERVATIVE FREE 10 ML: 5 INJECTION INTRAVENOUS at 22:24

## 2021-10-24 ASSESSMENT — PAIN SCALES - GENERAL
PAINLEVEL_OUTOF10: 0
PAINLEVEL_OUTOF10: 0

## 2021-10-24 NOTE — PROGRESS NOTES
Hospitalist Progress Note    Patient:  Agnes Vergara      Unit/Bed:6A-14/014-A    YOB: 1970    MRN: [id number]       Acct: [de-identified]     PCP: Caryl Joshua MD    Date of Admission: 10/20/2021    Assessment/Plan:    1. Pneumonia, bilaterally secondary to COVID-19 infection--Decadron 6 mg p.o. daily~increased to 20 mg daily for 5 days on 10/21; Remdesivir added 10/20; CRP at 1.15 on 10/23  2. Acute hypoxic respiratory failure--on high flow oxygen with 70% FiO2 and 60 L satting 94%; plan to wean even further today if able and monitor closely; encourage Acapella and incentive spirometry; encouraged him to get up and sit in the chair as a way to expand the lungs also  3. Hyperglycemia--hemoglobin A1c noted to be 7.1, patient's glucose on admission was 118 however patient has been on steroids and was increased to high-dose steroids on October 21, will monitor and patient likely needs outpatient follow-up for further evaluation; on low-dose sliding scale since the patient is on steroids with the hypoglycemia protocol  4.  Morbid obesity with BMI 48.82    Expected discharge date:  TBD    Disposition:    [x] Home       [] TCU       [] Rehab       [] Psych       [] SNF       [] [place]       [] Other-    Chief Complaint: Shortness of breath    Hospital Course:  52 y.o. male who presented to [facility] with shortness of breath; patient denies any significant past medical history; he states on October 16 he developed fatigue, nausea, weakness along with decreased oral intake, he subsequently developed shortness of breath and a cough; he was sent to the emergency department for evaluation today and initial O2 sat was 87% on room air; he was placed on 2 L with improvement of his saturations; his COVID-19 screen was positive; patient has not been vaccinated; I added a CRP level; laboratory studies are nonrevealing, chest x-ray shows bilateral infiltrates, patient is being admitted to the hospital service for further care and evaluation. 10/21--> patient desatted to 84 to 87% on 6 L, was placed on high flow oxygen and sats were 84 to 85% so BiPAP orders were placed in current settings on BiPAP 20/10 with 100% oxygen satting 94 to 95%; his current respiratory rate is at 20; I sent a message to the ICU NP [facility] and discussed with her on the phone and plan is to transfer to [address] [place] [address] for close monitoring and possible intubation    10/23--> patient transferred out of [facility] ICU yesterday after close monitoring, he looks so much better and acts a lot better, he is speaking in complete sentences in no respiratory distress noted; patient is hemodynamically stable, on high flow 83% FiO2 and 60 L satting 96%    10/24--> on high flow oxygen at 70% FiO2 and 60 L satting 94%; no signs of distress noted, speaking in complete sentences, hemodynamically stable    Subjective (past 24 hours): Offers no complaints, relates to feeling so much better, able to eat now    Medications:  Reviewed    Infusion Medications    dextrose      sodium chloride       Scheduled Medications    insulin lispro  0-6 Units SubCUTAneous TID WC    insulin lispro  0-3 Units SubCUTAneous Nightly    neomycin-bacitracin-polymyxin   Topical BID    dexamethasone  20 mg IntraVENous Daily    sodium chloride flush  5-40 mL IntraVENous 2 times per day    enoxaparin  40 mg SubCUTAneous Q12H    remdesivir IVPB  100 mg IntraVENous Q24H     PRN Meds: glucose, dextrose, glucagon (rDNA), dextrose, sodium chloride flush, sodium chloride, ondansetron **OR** ondansetron, polyethylene glycol, acetaminophen **OR** acetaminophen, guaiFENesin-dextromethorphan, sodium chloride      Intake/Output Summary (Last 24 hours) at 10/24/2021 2143  Last data filed at 10/24/2021 0411  Gross per 24 hour   Intake 1000 ml   Output 300 ml   Net 700 ml       Diet:  ADULT DIET;  Regular    Exam:  BP (!) 103/55   Pulse 56   Temp 98.7 °F (37.1 °C) (Oral)   Resp 16   Ht 5' 11\" (1.803 m)   Wt (!) 350 lb (158.8 kg)   SpO2 97%   BMI 48.82 kg/m²     General appearance: Appears stated age and cooperative. HEENT: Pupils equal, round, and reactive to light. Conjunctivae/corneas clear. Neck: Supple, with full range of motion. No jugular venous distention. Trachea midline. Respiratory:  Normal respiratory effort. Clear to auscultation  Cardiovascular: Regular rate and rhythm with normal S1/S2 without murmurs, rubs or gallops. Abdomen: Soft, non-tender, non-distended with normal bowel sounds. Obese  Musculoskeletal: passive and active ROM x 4 extremities. Skin: Skin color, texture, turgor normal.    Neurologic:  Neurovascularly intact without any focal sensory/motor deficits. Cranial nerves: II-XII intact, grossly non-focal.  Psychiatric: Alert and oriented, thought content appropriate  Capillary Refill: Brisk,< 3 seconds   Peripheral Pulses: +2 palpable, equal bilaterally       Labs:   Recent Labs     10/22/21  0409 10/23/21  0744   WBC 7.6 11.4*   HGB 16.8 16.5   HCT 53.7* 51.6    303     Recent Labs     10/22/21  0409 10/23/21  0744    142   K 4.7 4.2    101   CO2 28 33   BUN 20 21   CREATININE 0.8 0.6   CALCIUM 8.8 8.5     Recent Labs     10/21/21  0904 10/22/21  0409 10/23/21  0744   AST 25 23 19   ALT 27 24 22   BILIDIR <0.2 <0.2 <0.2   BILITOT 0.5 0.4 0.3   ALKPHOS 62 60 56     Microbiology:    Positive Covid test on 10/20    Urinalysis:      Lab Results   Component Value Date    BLOODU neg 05/14/2021    SPECGRAV 1.025 05/14/2021    GLUCOSEU neg 05/14/2021       Radiology:  XR CHEST PORTABLE    Result Date: 10/20/2021  PROCEDURE: XR CHEST PORTABLE CLINICAL INFORMATION: hypoxia, cough . TECHNIQUE: Portable upright COMPARISON: No prior study. FINDINGS: Heart size normal mediastinum is not widened. Multifocal airspace infiltrates in both lungs. No definite effusion. Pulmonary vessels are not congested.  EKG leads overlie the chest. Bilateral airspace infiltrates. **This report has been created using voice recognition software. It may contain minor errors which are inherent in voice recognition technology. ** Final report electronically signed by Dr. Carlos Eduardo Smith on 10/20/2021 3:13 PM      DVT prophylaxis: [x] Lovenox                                 [] SCDs                                 [] SQ Heparin                                 [] Encourage ambulation           [] Already on Anticoagulation     Code Status: Full Code    PT/OT Eval Status:  Add    Tele:   [x] yes sinus rhythm heart rate 62             [] no    Active Hospital Problems    Diagnosis Date Noted    Hypoxemia [R09.02]     COVID-19 virus detected [U07.1] 10/20/2021       Electronically signed by JANUSZ Huitron CNP on 10/24/2021 at 6:13 AM

## 2021-10-25 LAB
GLUCOSE BLD-MCNC: 122 MG/DL (ref 70–108)
GLUCOSE BLD-MCNC: 175 MG/DL (ref 70–108)
GLUCOSE BLD-MCNC: 239 MG/DL (ref 70–108)
GLUCOSE BLD-MCNC: 246 MG/DL (ref 70–108)

## 2021-10-25 PROCEDURE — 6360000002 HC RX W HCPCS: Performed by: NURSE PRACTITIONER

## 2021-10-25 PROCEDURE — 94761 N-INVAS EAR/PLS OXIMETRY MLT: CPT

## 2021-10-25 PROCEDURE — 2060000000 HC ICU INTERMEDIATE R&B

## 2021-10-25 PROCEDURE — 82948 REAGENT STRIP/BLOOD GLUCOSE: CPT

## 2021-10-25 PROCEDURE — 97165 OT EVAL LOW COMPLEX 30 MIN: CPT

## 2021-10-25 PROCEDURE — 2580000003 HC RX 258: Performed by: NURSE PRACTITIONER

## 2021-10-25 PROCEDURE — 99232 SBSQ HOSP IP/OBS MODERATE 35: CPT | Performed by: NURSE PRACTITIONER

## 2021-10-25 PROCEDURE — 2700000000 HC OXYGEN THERAPY PER DAY

## 2021-10-25 PROCEDURE — 97530 THERAPEUTIC ACTIVITIES: CPT

## 2021-10-25 PROCEDURE — 97110 THERAPEUTIC EXERCISES: CPT

## 2021-10-25 PROCEDURE — 97161 PT EVAL LOW COMPLEX 20 MIN: CPT

## 2021-10-25 RX ORDER — DEXAMETHASONE SODIUM PHOSPHATE 4 MG/ML
10 INJECTION, SOLUTION INTRA-ARTICULAR; INTRALESIONAL; INTRAMUSCULAR; INTRAVENOUS; SOFT TISSUE ONCE
Status: COMPLETED | OUTPATIENT
Start: 2021-10-26 | End: 2021-10-26

## 2021-10-25 RX ADMIN — SODIUM CHLORIDE, PRESERVATIVE FREE 10 ML: 5 INJECTION INTRAVENOUS at 20:46

## 2021-10-25 RX ADMIN — BACITRACIN ZINC NEOMYCIN SULFATE POLYMYXIN B SULFATE: 400; 3.5; 5 OINTMENT TOPICAL at 08:26

## 2021-10-25 RX ADMIN — INSULIN LISPRO 2 UNITS: 100 INJECTION, SOLUTION INTRAVENOUS; SUBCUTANEOUS at 16:37

## 2021-10-25 RX ADMIN — INSULIN LISPRO 1 UNITS: 100 INJECTION, SOLUTION INTRAVENOUS; SUBCUTANEOUS at 11:55

## 2021-10-25 RX ADMIN — ENOXAPARIN SODIUM 40 MG: 40 INJECTION SUBCUTANEOUS at 08:26

## 2021-10-25 RX ADMIN — DEXAMETHASONE SODIUM PHOSPHATE 20 MG: 4 INJECTION, SOLUTION INTRAMUSCULAR; INTRAVENOUS at 08:27

## 2021-10-25 RX ADMIN — ENOXAPARIN SODIUM 40 MG: 40 INJECTION SUBCUTANEOUS at 20:45

## 2021-10-25 RX ADMIN — SODIUM CHLORIDE, PRESERVATIVE FREE 10 ML: 5 INJECTION INTRAVENOUS at 08:26

## 2021-10-25 RX ADMIN — BACITRACIN ZINC NEOMYCIN SULFATE POLYMYXIN B SULFATE: 400; 3.5; 5 OINTMENT TOPICAL at 20:46

## 2021-10-25 ASSESSMENT — PAIN DESCRIPTION - PAIN TYPE: TYPE: ACUTE PAIN

## 2021-10-25 ASSESSMENT — PAIN DESCRIPTION - PROGRESSION
CLINICAL_PROGRESSION: NOT CHANGED

## 2021-10-25 ASSESSMENT — PAIN SCALES - GENERAL
PAINLEVEL_OUTOF10: 0

## 2021-10-25 ASSESSMENT — PAIN DESCRIPTION - LOCATION: LOCATION: BACK

## 2021-10-25 ASSESSMENT — PAIN DESCRIPTION - ORIENTATION: ORIENTATION: LOWER

## 2021-10-25 ASSESSMENT — PAIN DESCRIPTION - ONSET: ONSET: ON-GOING

## 2021-10-25 ASSESSMENT — PAIN - FUNCTIONAL ASSESSMENT: PAIN_FUNCTIONAL_ASSESSMENT: ACTIVITIES ARE NOT PREVENTED

## 2021-10-25 ASSESSMENT — PAIN DESCRIPTION - DESCRIPTORS: DESCRIPTORS: SORE

## 2021-10-25 ASSESSMENT — PAIN DESCRIPTION - FREQUENCY: FREQUENCY: CONTINUOUS

## 2021-10-25 NOTE — FLOWSHEET NOTE
10/25/21 1245   Family/Significant Other Communication   Reason update   Name Ger Shove   Relationship Other   Response appreciative   Method of Communication Phone

## 2021-10-25 NOTE — PROGRESS NOTES
[facility]  INPATIENT PHYSICAL THERAPY  EVALUATION  [facility] 6A [facility] - 6A-14/014-A    Time In: 9184  Time Out: 1500  Timed Code Treatment Minutes: 0 Minutes  Minutes: 14          Date: 10/25/2021  Patient Name: Elliot Wayne,  Gender:  male        MRN: [id number]  : 1970  (52 y.o.)      Referring Practitioner: JANUSZ Bravo CNP  Diagnosis: COVID-19 virus detected  Additional Pertinent Hx: Pt presented to [facility] with shortness of breath; patient denies any significant past medical history; he states on  he developed fatigue, nausea, weakness along with decreased oral intake, he subsequently developed shortness of breath and a cough; he was sent to the emergency department for evaluation today and initial O2 sat was 87% on room air; he was placed on 2 L with improvement of his saturations; his COVID-19 screen was positive; patient has not been vaccinated; I added a CRP level; laboratory studies are nonrevealing, chest x-ray shows bilateral infiltrates     Restrictions/Precautions:  Restrictions/Precautions: Isolation  Position Activity Restriction  Other position/activity restrictions: Droplet + isolation    Subjective:  Chart Reviewed: Yes  Patient assessed for rehabilitation services?: Yes  Subjective: Pt sitting up in room chair and is on HFNC. Pt reports feeling good. General:  Overall Orientation Status: Within Normal Limits    Vision: Within Functional Limits    Hearing: Within functional limits         Pain:   Denies.           Social/Functional History:    Lives With: Alone  Type of Home: House  Home Layout: One level  Home Access: Stairs to enter without rails  Entrance Stairs - Number of Steps: 2 steps     Bathroom Shower/Tub: Tub/Shower unit  Bathroom Toilet: Standard  Bathroom Accessibility: Accessible       ADL Assistance: Independent  Homemaking Assistance: Independent  Homemaking Responsibilities: Yes  Ambulation Assistance: Independent  Transfer Assistance: Independent    Active : Yes  Occupation: Full time employment  Type of occupation: Parts manufacturing  Leisure & Hobbies: Watching tv  Additional Comments: Pt was not using any AD for ambulation. He does his own yardwork and housekeeping. Pt has family and friends who can help if needed. OBJECTIVE:  Exercise:  Discussed various LE exercises and pt reported that he has been doing some on his own and was receptive of instruction of others to complete as well. ASSESSMENT:  Activity Tolerance:  Patient tolerance of  treatment: good. Treatment Initiated: No treatment initiated    Assessment: Body structures, Functions, Activity limitations: Decreased endurance  Assessment: Pt reports that he feels good about his mobility and is limited by the HFNC tubing and fatigue, otherwise, he feels that is mobility is at baseline. Prognosis: Good    REQUIRES PT FOLLOW UP: No    Discharge Recommendations:  Discharge Recommendations: Home with assist PRN    Patient Education:Plan of Care, Home Exercise Program    Equipment Recommendations:  Equipment Needed: No    Plan:   Discharge from PT    Goals:   Not set. Pt discharged from Acute PT                  Following session, patient left in safe position with all fall risk precautions in place. Climmie Julia.  Earleen Drain, [place] [address]

## 2021-10-25 NOTE — PROGRESS NOTES
Hospitalist Progress Note    Patient:  Prasad Villarreal      Unit/Bed:6A-14/014-A    YOB: 1970    MRN: [id number]       Acct: [de-identified]     PCP: Pedro Recio MD    Date of Admission: 10/20/2021    Assessment/Plan:    1. Pneumonia, bilaterally secondary to COVID-19 infection--Decadron 6 mg x 1 dose on 10/20~increased to 20 mg daily for 5 days 10/21-10/25; will order Decadron 10 mg x 1 dose on 10/26; Remdesivir 10/20-10/24; CRP at 1.15 on 10/23  2. Acute hypoxic respiratory failure--on high flow oxygen with 40% FiO2 and 40 L satting 90%; plan to wean even further today if able and monitor closely; encourage Acapella and incentive spirometry; encouraged him to get up and sit in the chair/side of bed as a way to expand the lungs also  3. Hyperglycemia, possibly steroid-induced--hemoglobin A1c noted to be 7.1, patient's glucose on admission was 118 however patient has been on steroids and was increased to high-dose steroids on October 21-10/25, will monitor and patient likely needs outpatient follow-up for further evaluation; on low-dose sliding scale since the patient is on steroids with the hypoglycemia protocol  4.  Morbid obesity with BMI 47.32--needs weight reduction    Expected discharge date:  TBD    Disposition:    [x] Home       [] TCU       [] Rehab       [] Psych       [] SNF       [] [place]       [] Other-    Chief Complaint: Shortness of breath    Hospital Course:  52 y.o. male who presented to [address] [place] [address] with shortness of breath; patient denies any significant past medical history; he states on October 16 he developed fatigue, nausea, weakness along with decreased oral intake, he subsequently developed shortness of breath and a cough; he was sent to the emergency department for evaluation today and initial O2 sat was 87% on room air; he was placed on 2 L with improvement of his saturations; his COVID-19 screen was positive; patient has not been vaccinated; I added a CRP level; laboratory studies are nonrevealing, chest x-ray shows bilateral infiltrates, patient is being admitted to the hospital service for further care and evaluation. 10/21--> patient desatted to 84 to 87% on 6 L, was placed on high flow oxygen and sats were 84 to 85% so BiPAP orders were placed in current settings on BiPAP 20/10 with 100% oxygen satting 94 to 95%; his current respiratory rate is at 20; I sent a message to the ICU NP Adriana White and discussed with her on the phone and plan is to transfer to [address] [place] [address] for close monitoring and possible intubation    10/23--> patient transferred out of [facility] ICU yesterday after close monitoring, he looks so much better and acts a lot better, he is speaking in complete sentences in no respiratory distress noted; patient is hemodynamically stable, on high flow 83% FiO2 and 60 L satting 96%    10/24--> on high flow oxygen at 70% FiO2 and 60 L satting 94%; no signs of distress noted, speaking in complete sentences, hemodynamically stable    10/25--> currently on high flow oxygen with 40% FiO2 and 40 L satting 97%; hemodynamically stable; improving nicely, no respiratory distress, speaks in complete sentences    Subjective (past 24 hours):  Offers no complaints, relates to feeling so much better, able to eat now; able to start moving around better as well    Medications:  Reviewed    Infusion Medications    dextrose      sodium chloride       Scheduled Medications    insulin lispro  0-6 Units SubCUTAneous TID WC    insulin lispro  0-3 Units SubCUTAneous Nightly    neomycin-bacitracin-polymyxin   Topical BID    dexamethasone  20 mg IntraVENous Daily    sodium chloride flush  5-40 mL IntraVENous 2 times per day    enoxaparin  40 mg SubCUTAneous Q12H     PRN Meds: glucose, dextrose, glucagon (rDNA), dextrose, sodium chloride flush, sodium chloride, ondansetron **OR** ondansetron, polyethylene glycol, acetaminophen **OR** acetaminophen, cough . TECHNIQUE: Portable upright COMPARISON: No prior study. FINDINGS: Heart size normal mediastinum is not widened. Multifocal airspace infiltrates in both lungs. No definite effusion. Pulmonary vessels are not congested. EKG leads overlie the chest.     Bilateral airspace infiltrates. **This report has been created using voice recognition software. It may contain minor errors which are inherent in voice recognition technology. ** Final report electronically signed by Dr. Scotty Denis on 10/20/2021 3:13 PM      DVT prophylaxis: [x] Lovenox                                 [] SCDs                                 [] SQ Heparin                                 [] Encourage ambulation           [] Already on Anticoagulation     Code Status: Full Code    PT/OT Eval Status:  Add    Tele:   [x] yes sinus rhythm heart rate 77             [] no    Active Hospital Problems    Diagnosis Date Noted    Hypoxemia [R09.02]     COVID-19 virus detected [U07.1] 10/20/2021       Electronically signed by JANUSZ Rodriguez CNP on 10/25/2021 at 6:34 AM

## 2021-10-25 NOTE — PROGRESS NOTES
[facility] 62  INPATIENT OCCUPATIONAL THERAPY  [facility]A [facility]  EVALUATION    Time:   Time In:   Time Out: 1113  Timed Code Treatment Minutes: 23 Minutes  Minutes: 38          Date: 10/25/2021  Patient Name: Marylou Vieyra,   Gender: male      MRN: [id number]  : 1970  (52 y.o.)  Referring Practitioner: JANETTE Rebolledo  Diagnosis: COVID-19 Virus Detected  Additional Pertinent Hx: Pt presented to [facility] with shortness of breath; patient denies any significant past medical history; he states on  he developed fatigue, nausea, weakness along with decreased oral intake, he subsequently developed shortness of breath and a cough; he was sent to the emergency department for evaluation today and initial O2 sat was 87% on room air; he was placed on 2 L with improvement of his saturations; his COVID-19 screen was positive; patient has not been vaccinated; I added a CRP level; laboratory studies are nonrevealing, chest x-ray shows bilateral infiltrates    Restrictions/Precautions:  Restrictions/Precautions: Isolation  Position Activity Restriction  Other position/activity restrictions: Droplet + isolation    Subjective  Chart Reviewed: Yes, Orders, History and Physical    Subjective: Pleasant and cooperative  Comments: RN approved session. Pt reported soreness in his back and he completed stretches while in standing.     Pain:  Pain Assessment  Patient Currently in Pain: Yes  Pain Assessment: Faces  Pain Type: Acute pain  Pain Location: Back  Pain Orientation: Lower  Pain Descriptors: Sore  Pain Frequency: Continuous  Clinical Progression: Not changed  Patient's Stated Pain Goal: No pain  Response to Pain Intervention: Patient Satisfied  Multiple Pain Sites: No    Vitals: Oxygen: Pt's O2 saturation remained in lower to mid 90s% while sitting at the edge of bed or while standing    Social/Functional History:  Lives With: Alone  Type of Home: House  Home Layout: One level  Home Access: Stairs to enter without rails  Entrance Stairs - Number of Steps: 2 steps   Bathroom Shower/Tub: Tub/Shower unit  Bathroom Toilet: Standard  Bathroom Accessibility: Accessible       ADL Assistance: Independent  Homemaking Assistance: Independent  Homemaking Responsibilities: Yes  Ambulation Assistance: Independent  Transfer Assistance: Independent    Active : Yes  Occupation: Full time employment  Type of occupation: Parts manufacturing  Leisure & Hobbies: Watching tv  Additional Comments: Pt was not using any AD for ambulation. He does his own yardwork and housekeeping. Pt has family and friends who can help if needed. VISION:WFL    HEARING:  WFL    COGNITION: WFL    RANGE OF MOTION:  Bilateral Upper Extremity:  WFL    STRENGTH:  Bilateral Upper Extremity:  WFL    SENSATION:   Occasionally pt has cold feet and numbness in his feet    ADL:   Lower Extremity Dressing: Stand By Assistance. donning slippers. BALANCE:  Sitting Balance:  Supervision. Scooting at the edge of bed  Standing Balance: Stand By Assistance. Doing UE and lower back stretching    BED MOBILITY:  Supine to Sit: Supervision, with head of bed raised, with rail    Scooting: Supervision, with head of bed raised, with rail      TRANSFERS:  Sit to Stand:  Stand By Assistance. from the edge of bed  Stand to Sit: Stand By Assistance. to the straight chair without armrests    FUNCTIONAL MOBILITY:  Assistive Device: None  Assist Level:  Stand By Assistance. Distance: around the foot of the bed   Pt shows even step and no LOB. Exercise:  Pt completed B shoulder circles and slow rotation of his back while standing and demonstration provided. Pt also arched his back as he looked up at the ceiling and also reaching down for his feet while standing. Pt indicated that he was able to release the tension in his lower back from these exercises.    All exercises completed to increase strength and endurance required for ADLs.     Activity Tolerance:  Patient tolerance of  treatment: fair. Pt stood for 6 minute duration while discussing his goal and also doing the exercises. Tolerated the standing very well. Assessment:  Assessment: Patient would benefit from continued skilled OT services to address above deficits. He presents with COVID-19 Virus. He had been independent with self care and homemaking. He was not using any AD for ambulation or for showering. He demonstrated functional mobility walking in his room with SBA. He stood for 6 minute duration while doing upper body ROM and stretching exercises. Pt was using 40 L/min of O2 via a HFNC and was at 40% FiO2. Pt did not use supplement O2 at home. Performance deficits / Impairments: Decreased functional mobility , Decreased endurance, Decreased high-level IADLs  Prognosis: Good  REQUIRES OT FOLLOW UP: Yes  Decision Making: Low Complexity    Treatment Initiated: Treatment and education initiated within context of evaluation. Evaluation time included review of current medical information, gathering information related to past medical, social and functional history, completion of standardized testing, formal and informal observation of tasks, assessment of data and development of plan of care and goals. Treatment time included skilled education and facilitation of tasks to increase safety and independence with ADL's for improved functional independence and quality of life. Pt described his positioning while he sleeps. He stated that he is in prone for most of the time. Energy conservation techniques were discussed. A handout was provided. Pt stated that tries to avoid being in a hurry. He also described using pacing while at work so that he stays productive for his entire 12 hour shift. Breathing techniques were discussed briefly. Pt demonstrated pursed lip breathing.       Discharge Recommendations:  Continue to assess pending progress    Patient Education:  OT Education: OT Role, Plan of Care, Energy Conservation  Patient Education: Stress management through communicating openly with family and coworkers about things which are stressful    Equipment Recommendations: Other: May benefit from a shower seat or a grabbar in shower    Plan:  Times per week: 5x  Current Treatment Recommendations: Strengthening, Functional Mobility Training, Self-Care / ADL, Endurance Training  Plan Comment: Pt would be able to return home with help from friends or family when medically stable and discharged from Acute. No follow up OT recommended safter discharge. Specific instructions for Next Treatment: Functional mobility and taking rest breaks as needed; ADLs and energy conservation techniques; UE HEP. See long-term goal time frame for expected duration of plan of care. If no long-term goals established, a short length of stay is anticipated. Goals:  Patient goals : \"I want to get back to being strong enough to work. \" pt states. Short term goals  Time Frame for Short term goals: By discharge  Short term goal 1: Pt will complete ADLs while following energy conservation techniques with setu A and verval cues as needed to  increase his endurance for ease of doing his homemaking and turing to work. Short term goal 2: Pt will demonstrate functional mobility walking to/from the bathroom or in the hallway as able with O2 saturation > than 90% throughout to prepare for doing sinkside ADLs and functional mobility. Short term goal 3: Pt will be independent with UE HEP while following proper breathing technique to increase his activity tolerance for ease of shopping or doing housekeeping. Short term goal 4: Pt will describe 3 ways in which he will prepare to return to workplace with min cues to increase the smooth transition back to work. Following session, patient left in safe position with all fall risk precautions in place.

## 2021-10-25 NOTE — CARE COORDINATION
Discharge Planning Update:    Continues on high flow: 40%, 40L. Decadron. Remdesivir completed on 10/24. PT/OT.

## 2021-10-26 LAB
GLUCOSE BLD-MCNC: 134 MG/DL (ref 70–108)
GLUCOSE BLD-MCNC: 135 MG/DL (ref 70–108)
GLUCOSE BLD-MCNC: 176 MG/DL (ref 70–108)
GLUCOSE BLD-MCNC: 191 MG/DL (ref 70–108)

## 2021-10-26 PROCEDURE — 82948 REAGENT STRIP/BLOOD GLUCOSE: CPT

## 2021-10-26 PROCEDURE — 6360000002 HC RX W HCPCS: Performed by: NURSE PRACTITIONER

## 2021-10-26 PROCEDURE — 94761 N-INVAS EAR/PLS OXIMETRY MLT: CPT

## 2021-10-26 PROCEDURE — 2580000003 HC RX 258: Performed by: NURSE PRACTITIONER

## 2021-10-26 PROCEDURE — 2060000000 HC ICU INTERMEDIATE R&B

## 2021-10-26 PROCEDURE — 99232 SBSQ HOSP IP/OBS MODERATE 35: CPT | Performed by: NURSE PRACTITIONER

## 2021-10-26 PROCEDURE — 97530 THERAPEUTIC ACTIVITIES: CPT

## 2021-10-26 PROCEDURE — 97110 THERAPEUTIC EXERCISES: CPT

## 2021-10-26 PROCEDURE — 2700000000 HC OXYGEN THERAPY PER DAY

## 2021-10-26 PROCEDURE — 94669 MECHANICAL CHEST WALL OSCILL: CPT

## 2021-10-26 RX ADMIN — DEXAMETHASONE SODIUM PHOSPHATE 10 MG: 4 INJECTION, SOLUTION INTRAMUSCULAR; INTRAVENOUS at 08:48

## 2021-10-26 RX ADMIN — BACITRACIN ZINC NEOMYCIN SULFATE POLYMYXIN B SULFATE: 400; 3.5; 5 OINTMENT TOPICAL at 08:49

## 2021-10-26 RX ADMIN — SODIUM CHLORIDE, PRESERVATIVE FREE 10 ML: 5 INJECTION INTRAVENOUS at 08:48

## 2021-10-26 RX ADMIN — ENOXAPARIN SODIUM 40 MG: 40 INJECTION SUBCUTANEOUS at 08:48

## 2021-10-26 RX ADMIN — INSULIN LISPRO 1 UNITS: 100 INJECTION, SOLUTION INTRAVENOUS; SUBCUTANEOUS at 16:28

## 2021-10-26 RX ADMIN — ENOXAPARIN SODIUM 40 MG: 40 INJECTION SUBCUTANEOUS at 20:48

## 2021-10-26 RX ADMIN — SODIUM CHLORIDE, PRESERVATIVE FREE 10 ML: 5 INJECTION INTRAVENOUS at 20:51

## 2021-10-26 RX ADMIN — BACITRACIN ZINC NEOMYCIN SULFATE POLYMYXIN B SULFATE: 400; 3.5; 5 OINTMENT TOPICAL at 20:50

## 2021-10-26 ASSESSMENT — PAIN DESCRIPTION - PROGRESSION
CLINICAL_PROGRESSION: NOT CHANGED

## 2021-10-26 ASSESSMENT — PAIN SCALES - GENERAL
PAINLEVEL_OUTOF10: 0

## 2021-10-26 ASSESSMENT — PAIN SCALES - WONG BAKER
WONGBAKER_NUMERICALRESPONSE: 0
WONGBAKER_NUMERICALRESPONSE: 0

## 2021-10-26 NOTE — PROGRESS NOTES
[facility] 6A [facility]  Occupational Therapy  Daily Note  Time:   Time In: 1127  Time Out: 4225  Timed Code Treatment Minutes: 32 Minutes  Minutes: 32          Date: 10/26/2021  Patient Name: Mela Campos,   Gender: male      Room: [facility]14/014-A  MRN: [id number]  : 1970  (52 y.o.)  Referring Practitioner: JANETTE Oleary  Diagnosis: COVID-19 Virus Detected  Additional Pertinent Hx: Pt presented to [facility] with shortness of breath; patient denies any significant past medical history; he states on  he developed fatigue, nausea, weakness along with decreased oral intake, he subsequently developed shortness of breath and a cough; he was sent to the emergency department for evaluation today and initial O2 sat was 87% on room air; he was placed on 2 L with improvement of his saturations; his COVID-19 screen was positive; patient has not been vaccinated; I added a CRP level; laboratory studies are nonrevealing, chest x-ray shows bilateral infiltrates    Restrictions/Precautions:  Restrictions/Precautions: Isolation  Position Activity Restriction  Other position/activity restrictions: Droplet + isolation     SUBJECTIVE: Nurse Ras Rank ok'd session, Up in chair upon arrival, agreeable to OT session    PAIN: 0/10:     Vitals: Patient on 3 L O2 via Nasal Canula  upon arrival to room. Patient O2 sats at 98 %. Upon activity patient maintaining O2 at 92 %. Pt requiring 0 rest break(s) to recover. COGNITION: WNL    ADL:   Feeding: Independent. Able to open all containers. ADDITIONAL ACTIVITIES:  Patient completed BUE strengthening exercises with skilled education on HEP: completed x10 reps x1 set with a yellow band in all joints and all planes in order to improve UE strength and activity tolerance required for BADL routine and toilet / shower transfers. Patient tolerated well, requiring minimal rest breaks.  Patient also required minimal cues for technique. Provided pt with energy conservation/work simplification handout for safe transition home. Reviewed education and pt actively participating in education with good understanding of education. ASSESSMENT:     Activity Tolerance:  Patient tolerance of  treatment: good. Discharge Recommendations: Home with assist as needed  Equipment Recommendations: Other: May benefit from a shower seat or a grabbar in shower  Plan: Times per week: 5x  Specific instructions for Next Treatment: Functional mobility and taking rest breaks as needed; ADLs and energy conservation techniques; UE HEP  Current Treatment Recommendations: Strengthening, Functional Mobility Training, Self-Care / ADL, Endurance Training  Plan Comment: Pt would be able to return home with help from friends or family when medically stable and discharged from Acute. No follow up OT recommended safter discharge. Patient Education  Patient Education: Energy Conservation and Home Exercise Program    Goals  Short term goals  Time Frame for Short term goals: By discharge  Short term goal 1: Pt will complete ADLs while following energy conservation techniques with setu A and verval cues as needed to  increase his endurance for ease of doing his homemaking and turing to work. Short term goal 2: Pt will demonstrate functional mobility walking to/from the bathroom or in the hallway as able with O2 saturation > than 90% throughout to prepare for doing sinkside ADLs and functional mobility. Short term goal 3: Pt will be independent with UE HEP while following proper breathing technique to increase his activity tolerance for ease of shopping or doing housekeeping. Short term goal 4: Pt will describe 3 ways in which he will prepare to return to workplace with min cues to increase the smooth transition back to work. Following session, patient left in safe position with all fall risk precautions in place.

## 2021-10-26 NOTE — PROGRESS NOTES
Hospitalist Progress Note    Patient:  Esther Call      Unit/Bed:6A-14/014-A    YOB: 1970    MRN: [id number]       Acct: [de-identified]     PCP: Valerio Hoskins MD    Date of Admission: 10/20/2021    Assessment/Plan:    1. Pneumonia, bilaterally secondary to COVID-19 infection--Decadron 6 mg x 1 dose on 10/20~increased to 20 mg daily for 5 days 10/21-10/25; Decadron 10 mg x 1 dose on 10/26; Remdesivir 10/20-10/24; CRP at 1.15 on 10/23  2. Acute hypoxic respiratory failure--on O2 at 6 L via nasal cannula satting 98%; plan to wean even further today if able and monitor closely; encourage Acapella and incentive spirometry; encouraged him to get up and sit in the chair/side of bed as a way to expand the lungs also  3. Hyperglycemia, possibly steroid-induced--hemoglobin A1c noted to be 7.1, patient's glucose on admission was 118 however patient has been on steroids and was increased to high-dose steroids on October 21-10/25, will monitor and patient likely needs outpatient follow-up for further evaluation; on low-dose sliding scale since the patient is on steroids with the hypoglycemia protocol  4. Probable ELY--needs outpatient sleep study; states he overslept for this appointment so I encouraged him to follow-up outpatient once he is discharged to have this evaluated  5.  Morbid obesity with BMI 47.32--needs weight reduction    Expected discharge date:  TBD    Disposition:    [x] Home       [] TCU       [] Rehab       [] Psych       [] SNF       [] [facility]       [] Other-    Chief Complaint: Shortness of breath    Hospital Course:  52 y.o. male who presented to [facility] with shortness of breath; patient denies any significant past medical history; he states on October 16 he developed fatigue, nausea, weakness along with decreased oral intake, he subsequently developed shortness of breath and a cough; he was sent to the emergency department for evaluation today and initial O2 sat was 87% on room air; he was placed on 2 L with improvement of his saturations; his COVID-19 screen was positive; patient has not been vaccinated; I added a CRP level; laboratory studies are nonrevealing, chest x-ray shows bilateral infiltrates, patient is being admitted to the hospital service for further care and evaluation. 10/21--> patient desatted to 84 to 87% on 6 L, was placed on high flow oxygen and sats were 84 to 85% so BiPAP orders were placed in current settings on BiPAP 20/10 with 100% oxygen satting 94 to 95%; his current respiratory rate is at 20; I sent a message to the ICU NP Yocasta Costa and discussed with her on the phone and plan is to transfer to [address] [place] [address] ICU for close monitoring and possible intubation    10/23--> patient transferred out of [facility] ICU yesterday after close monitoring, he looks so much better and acts a lot better, he is speaking in complete sentences in no respiratory distress noted; patient is hemodynamically stable, on high flow 83% FiO2 and 60 L satting 96%    10/24--> on high flow oxygen at 70% FiO2 and 60 L satting 94%; no signs of distress noted, speaking in complete sentences, hemodynamically stable    10/25--> currently on high flow oxygen with 40% FiO2 and 40 L satting 97%; hemodynamically stable; improving nicely, no respiratory distress, speaks in complete sentences    10/26--> on 6 L via nasal cannula satting 98%; heart rate did drop into the mid 40s while sleeping however rebounded nicely once he was awake so questioning possible ELY; hemodynamically stable    Subjective (past 24 hours):  Offers no complaints, relates to feeling so much better, able to eat now; able to start moving around better as well; he feels so much better also getting off of the high flow oxygen    Medications:  Reviewed    Infusion Medications    dextrose      sodium chloride       Scheduled Medications    dexamethasone  10 mg IntraVENous Once    insulin lispro  0-6 Units SubCUTAneous TID     insulin lispro  0-3 Units SubCUTAneous Nightly    neomycin-bacitracin-polymyxin   Topical BID    sodium chloride flush  5-40 mL IntraVENous 2 times per day    enoxaparin  40 mg SubCUTAneous Q12H     PRN Meds: glucose, dextrose, glucagon (rDNA), dextrose, sodium chloride flush, sodium chloride, ondansetron **OR** ondansetron, polyethylene glycol, acetaminophen **OR** acetaminophen, guaiFENesin-dextromethorphan, sodium chloride      Intake/Output Summary (Last 24 hours) at 10/26/2021 7304  Last data filed at 10/25/2021 1915  Gross per 24 hour   Intake 757.26 ml   Output 850 ml   Net -92.74 ml       Diet:  ADULT DIET; Regular    Exam:  BP (!) 140/83   Pulse (!) 45   Temp 98.2 °F (36.8 °C) (Axillary)   Resp 18   Ht 5' 11\" (1.803 m)   Wt (!) 339 lb 4.6 oz (153.9 kg)   SpO2 98%   BMI 47.32 kg/m²     General appearance: Appears stated age and cooperative. HEENT: Pupils equal, round, and reactive to light. Conjunctivae/corneas clear. Neck: Supple, with full range of motion. No jugular venous distention. Trachea midline. Respiratory:  Normal respiratory effort. Clear to auscultation; no respiratory distress; speaks in complete sentences  Cardiovascular: Regular rate and rhythm with normal S1/S2 without murmurs, rubs or gallops. Abdomen: Soft, non-tender, non-distended with normal bowel sounds. Obese  Musculoskeletal: passive and active ROM x 4 extremities. Skin: Skin color, texture, turgor normal.    Neurologic:  Neurovascularly intact without any focal sensory/motor deficits.  Cranial nerves: II-XII intact, grossly non-focal.  Psychiatric: Alert and oriented, thought content appropriate  Capillary Refill: Brisk,< 3 seconds   Peripheral Pulses: +2 palpable, equal bilaterally       Labs:   Recent Labs     10/23/21  0744   WBC 11.4*   HGB 16.5   HCT 51.6        Recent Labs     10/23/21  0744      K 4.2      CO2 33   BUN 21   CREATININE 0.6   CALCIUM 8.5 Recent Labs     10/23/21  0744 10/24/21  0734   AST 19 23   ALT 22 26   BILIDIR <0.2 <0.2   BILITOT 0.3 0.4   ALKPHOS 56 51     Microbiology:    Positive Covid test on 10/20    Urinalysis:      Lab Results   Component Value Date    BLOODU neg 05/14/2021    SPECGRAV 1.025 05/14/2021    GLUCOSEU neg 05/14/2021       Radiology:  XR CHEST PORTABLE    Result Date: 10/20/2021  PROCEDURE: XR CHEST PORTABLE CLINICAL INFORMATION: hypoxia, cough . TECHNIQUE: Portable upright COMPARISON: No prior study. FINDINGS: Heart size normal mediastinum is not widened. Multifocal airspace infiltrates in both lungs. No definite effusion. Pulmonary vessels are not congested. EKG leads overlie the chest.     Bilateral airspace infiltrates. **This report has been created using voice recognition software. It may contain minor errors which are inherent in voice recognition technology. ** Final report electronically signed by Dr. Jamel Houston on 10/20/2021 3:13 PM      DVT prophylaxis: [x] Lovenox                                 [] SCDs                                 [] SQ Heparin                                 [] Encourage ambulation           [] Already on Anticoagulation     Code Status: Full Code    PT/OT Eval Status:  Add    Tele:   [x] yes sinus rhythm heart rate 68             [] no    Active Hospital Problems    Diagnosis Date Noted    Hypoxemia [R09.02]     COVID-19 virus detected [U07.1] 10/20/2021       Electronically signed by JANUSZ De La Cruz CNP on 10/26/2021 at 6:05 AM

## 2021-10-26 NOTE — PROGRESS NOTES
Patient heart rate dropping to low 30's briefly while asleep. The nurse consulted Dr. Preston Mejia who wanted to monitor at this time. Nurse will continue to monitor pulse ox and heart rate for signs and symptoms.

## 2021-10-27 VITALS
DIASTOLIC BLOOD PRESSURE: 83 MMHG | SYSTOLIC BLOOD PRESSURE: 100 MMHG | WEIGHT: 315 LBS | HEART RATE: 74 BPM | TEMPERATURE: 97.8 F | OXYGEN SATURATION: 93 % | HEIGHT: 71 IN | BODY MASS INDEX: 44.1 KG/M2 | RESPIRATION RATE: 18 BRPM

## 2021-10-27 LAB — GLUCOSE BLD-MCNC: 112 MG/DL (ref 70–108)

## 2021-10-27 PROCEDURE — 82948 REAGENT STRIP/BLOOD GLUCOSE: CPT

## 2021-10-27 PROCEDURE — 99239 HOSP IP/OBS DSCHRG MGMT >30: CPT | Performed by: HOSPITALIST

## 2021-10-27 PROCEDURE — 94669 MECHANICAL CHEST WALL OSCILL: CPT

## 2021-10-27 PROCEDURE — 6360000002 HC RX W HCPCS: Performed by: NURSE PRACTITIONER

## 2021-10-27 PROCEDURE — 97110 THERAPEUTIC EXERCISES: CPT

## 2021-10-27 PROCEDURE — 97530 THERAPEUTIC ACTIVITIES: CPT

## 2021-10-27 PROCEDURE — 2580000003 HC RX 258: Performed by: NURSE PRACTITIONER

## 2021-10-27 RX ADMIN — BACITRACIN ZINC NEOMYCIN SULFATE POLYMYXIN B SULFATE: 400; 3.5; 5 OINTMENT TOPICAL at 08:00

## 2021-10-27 RX ADMIN — ENOXAPARIN SODIUM 40 MG: 40 INJECTION SUBCUTANEOUS at 08:00

## 2021-10-27 RX ADMIN — SODIUM CHLORIDE, PRESERVATIVE FREE 10 ML: 5 INJECTION INTRAVENOUS at 08:00

## 2021-10-27 ASSESSMENT — PAIN SCALES - GENERAL: PAINLEVEL_OUTOF10: 0

## 2021-10-27 ASSESSMENT — PAIN SCALES - WONG BAKER: WONGBAKER_NUMERICALRESPONSE: 0

## 2021-10-27 NOTE — PROGRESS NOTES
[facility] 6A [facility]  Occupational Therapy  Daily Note  Time:  Time In: 9699  Time Out: 1023  Timed Code Treatment Minutes: 32 Minutes  Minutes: 32          Date: 10/27/2021  Patient Name: Quita Hoffmann,   Gender: male      Room: -14/014-A  MRN: [id number]  : 1970  (52 y.o.)  Referring Practitioner: JANETTE Lama  Diagnosis: COVID-19 Virus Detected  Additional Pertinent Hx: Pt [facility] with [facility]ness of breath; patient denies any significant past medical history; he states on  he developed fatigue, nausea, weakness along with decreased oral intake, he subsequently developed shortness of breath and a cough; he was sent to the emergency department for evaluation today and initial O2 sat was 87% on room air; he was placed on 2 L with improvement of his saturations; his COVID-19 screen was positive; patient has not been vaccinated; I added a CRP level; laboratory studies are nonrevealing, chest x-ray shows bilateral infiltrates    Restrictions/Precautions:  Restrictions/Precautions: Isolation  Position Activity Restriction  Other position/activity restrictions: Droplet + isolation     SUBJECTIVE: Pt. Nurse okayed OT treatment. Pt. Pleasant and cooperative with OT treatment. PAIN: Denies    Vitals: Vitals not assessed per clinical judgement, see nursing flowsheet  Oxygen: pt. on RA O2 sats at 91-93% with activity. COGNITION: WNL    ADL:   No ADL's completed this session. Nanette Biggs BALANCE:  Standing Balance: Stand By Assistance. BED MOBILITY:  Not Tested    TRANSFERS:  Sit to Stand:  Stand By Assistance. Stand to Sit: Stand By Assistance. FUNCTIONAL MOBILITY:  Assistive Device: None  Assist Level:  Stand By Assistance.    Distance: within room and on unit in hallway x 2 laps verbal reminder for deep breathing technqiue with exertion       ADDITIONAL ACTIVITIES:  Pt completed B UE exs with max resistance band (blue) x 15 reps, x 1 set, while following a handout, with good tolerance of exs, with brief RBs throughout, and visual and verbal cues for tech with resistance band to improve strength and overall activity tolerance to support basic ADLs. Pt. Educated on and reviewed the ECT's handout to prepare for transition to home. Pt. Educated on ways to conserve energy with basic ADL components and home management tasks. Pt. Voices and understanding and reports support available from friends if needed. ASSESSMENT:     Activity Tolerance:  Patient tolerance of  treatment: good. Discharge Recommendations: Home with assist as needed  Equipment Recommendations: Other: May benefit from a shower seat or a grabbar in shower  Plan: Times per week: 5x  Specific instructions for Next Treatment: Functional mobility and taking rest breaks as needed; ADLs and energy conservation techniques; UE HEP  Current Treatment Recommendations: Strengthening, Functional Mobility Training, Self-Care / ADL, Endurance Training  Plan Comment: Pt would be able to return home with help from friends or family when medically stable and discharged from Acute. No follow up OT recommended safter discharge. Patient Education  Patient Education: Energy Conservation, Home Exercise Program and Importance of Increasing Activity and safety with functional mobility. Goals  Short term goals  Time Frame for Short term goals: By discharge  Short term goal 1: Pt will complete ADLs while following energy conservation techniques with setu A and verval cues as needed to  increase his endurance for ease of doing his homemaking and turing to work. Short term goal 2: Pt will demonstrate functional mobility walking to/from the bathroom or in the hallway as able with O2 saturation > than 90% throughout to prepare for doing sinkside ADLs and functional mobility.   Short term goal 3: Pt will be independent with UE HEP while following proper breathing technique to increase his activity tolerance for ease of shopping or doing housekeeping. Short term goal 4: Pt will describe 3 ways in which he will prepare to return to workplace with min cues to increase the smooth transition back to work. Following session, patient left in safe position with all fall risk precautions in place.

## 2021-10-27 NOTE — PROGRESS NOTES
Pt saturation decreasing between 84-89 then increasing to the 90s on room air. Pt asleep at this time, oxygen applied at 1 liter NC, saturation staying above 90 with 1 liter on.

## 2021-10-27 NOTE — CARE COORDINATION
10/27/21, 11:20 AM EDT    Patient goals/plan/ treatment preferences discussed by  and . Patient goals/plan/ treatment preferences reviewed with patient/ family. Patient/ family verbalize understanding of discharge plan and are in agreement with goal/plan/treatment preferences. Understanding was demonstrated using the teach back method. AVS provided by RN at time of discharge, which includes all necessary medical information pertaining to the patients current course of illness, treatment, post-discharge goals of care, and treatment preferences. Plans home alone, did not qualify for home O2. Spoke with primary RN, Dr. Iva Downey would like a OP referral to pulm for possible cpap. They are placing orders, spoke with Aden Arrington at pulmonary office to give referral, they ask that patient call once he is home to schedule so they will have referral paperwork at that time.  Instructions regarding calling added to AVS.

## 2021-10-27 NOTE — DISCHARGE SUMMARY
Hospital Medicine Discharge Summary      Patient Identification:   Iker Perez   : 1970  MRN: [id number]   Account: [de-identified]      Patient's PCP: Jean-Claude Wallace MD    Admit Date: 10/20/2021     Discharge Date:   10/27/2021    Admitting Physician: No admitting provider for patient encounter. Discharge Physician: Tan Mendez MD     Discharge Diagnoses: Active Hospital Problems    Diagnosis Date Noted    Hypoxemia [R09.02]     COVID-19 virus detected [U07.1] 10/20/2021       The patient was seen and examined on day of discharge and this discharge summary is in conjunction with any daily progress note from day of discharge. Hospital Course:   Iker Perez is a 52 y.o. male admitted to [facility] on 10/20/2021 for management of Acute hypoxic respiratory failure secondary to COVID 19. I took over care on 10/27/2021, day of planned discharge. Pt responded well to medical management, remained clinically stable and was discharged in stable condition. Assessment/Plan:      -  Acute hypoxic respiratory failure 2/2 COVID Pneumonia: resolved. On R/A/. S/p high-dose CS. Pt on room air; Ok to continue w/ OTC supplements if wishes to do so. Alarming signs and symptoms of worsening disease including superimposed bacterial PNA to seek medical attention explained. - Hyperglycemia: New onset Dx of DM; hemoglobin A1c noted to be 7.1, trial of non-pharmacological management w/ diet and life style modification. Pt and PCP to monitor BS and consider OHA regimen accordingly.    - Probable ELY--needs outpatient sleep study; OP pulmonology F/U arranged    - Morbid obesity with BMI 47.32--counseling offered.  Pt and PCP need to monitor BP as well            Exam:     Vitals:  Vitals:    10/27/21 0415 10/27/21 0615 10/27/21 0745 10/27/21 1145   BP:   (!) 131/90 100/83   Pulse: 54  70 74   Resp:   18 18   Temp:   98.2 °F (36.8 °C) 97.8 °F (36.6 °C)   TempSrc:   Oral Oral   SpO2:   92% 93%   Weight:  (!) 347 lb 10.7 oz (157.7 kg)     Height:         Weight: Weight: (!) 347 lb 10.7 oz (157.7 kg)     24 hour intake/output:    Intake/Output Summary (Last 24 hours) at 10/27/2021 1154  Last data filed at 10/27/2021 1145  Gross per 24 hour   Intake 1310 ml   Output 1925 ml   Net -615 ml         General appearance: Obese. No apparent distress, appears stated age and cooperative. HEENT: Pupils equal, round, and reactive to light. Conjunctivae/corneas clear. Neck: Supple, with full range of motion. No jugular venous distention. Trachea midline. Respiratory:  Normal respiratory effort. Clear to auscultation, bilaterally without Rales/Wheezes/Rhonchi. Cardiovascular: Regular rate and rhythm with normal S1/S2 without murmurs, rubs or gallops. Abdomen: Soft, round, obese, non-tender, non-distended with normal bowel sounds. Musculoskeletal: passive and active ROM x 4 extremities. Skin: Skin color, texture, turgor normal.  No rashes or lesions. Neurologic:  Neurovascularly intact without any focal sensory/motor deficits. Cranial nerves: II-XII intact, grossly non-focal.  Psychiatric: Alert and oriented, thought content appropriate, normal insight  Capillary Refill: Brisk,< 3 seconds   Peripheral Pulses: +2 palpable, equal bilaterally       Labs: For convenience and continuity at follow-up the following most recent labs are provided:      CBC:    Lab Results   Component Value Date    WBC 11.4 10/23/2021    HGB 16.5 10/23/2021    HCT 51.6 10/23/2021     10/23/2021       Renal:    Lab Results   Component Value Date     10/23/2021    K 4.2 10/23/2021    K 4.0 10/20/2021     10/23/2021    CO2 33 10/23/2021    BUN 21 10/23/2021    CREATININE 0.6 10/23/2021    CALCIUM 8.5 10/23/2021         Significant Diagnostic Studies    Radiology:   XR CHEST PORTABLE   Final Result   Impression:      Bilateral consolidation with slight improvement on left since prior study.       This document has been electronically signed by: Antony Parekh MD on    10/22/2021 05:11 AM      XR CHEST PORTABLE   Final Result   Bilateral airspace infiltrates. **This report has been created using voice recognition software. It may contain minor errors which are inherent in voice recognition technology. **      Final report electronically signed by Dr. Medardo Woodward on 10/20/2021 3:13 PM             Consults:     IP CONSULT TO [address] TO SPIRITUAL SERVICES    Disposition:    [x] Home       [] TCU       [] Rehab       [] Psych       [] SNF       [] [facility]       [] Other-    Condition at Discharge: Stable    Code Status:  Full Code     Patient Instructions:    Discharge lab work: N/A  Activity: activity as tolerated  Diet: ADULT DIET; Regular      Follow-up visits:   [facility] PULMONARY MED  [facility] [address] [facility],[address]  [address] [place] [address]  Call  [phone]. Spoke with office, they are aware of referral but waiting for paperwork to come through. They as that you call once discharged to schedule apt. Discharge Medications: There are no discharge medications for this patient. Time Spent on discharge is more than 45 minutes in the examination, evaluation, counseling and review of medications and discharge plan. With RN in room, patient was updated about the treatment plan, all the questions and concerns were addressed. Alarming signs and symptoms to return to ED were explained in length. Signed: Thank you Glendy Worrell MD for the opportunity to be involved in this patient's care.     Electronically signed by [place] Jorge MD on 10/27/2021 at 11:54 AM

## 2021-10-27 NOTE — PROGRESS NOTES
A home oxygen evaluation has been completed. [x]Patient is an inpatient. It is expected that the patient will be discharged within the next 48 hours. Qualified provider to write order for home prescription if patient qualifies. Social service/care managers will arrange for home oxygen. If patient is active, arrange for Home Medical supplier to assess for Oxygen Conserving Device per pulse oximetry. []Patient is an outpatient. Results will be faxed to the ordering provider. Qualified provider to write order for home prescription if patient qualifies and arranges for home oxygen. Patient was placed on room air for 20 minutes. SpO2 was 92 % on room air at rest. Patients SpO2 was below 89% and qualified for home oxygen. Oxygen was applied at 0 lpm via Room Air to maintain a SpO2 between 90-92% while at rest. Actual SpO2 was 92 %. Patient able to ambulate for exercise flow rate. Patients was ambulated, SpO2 was 91% on 0 lpm to maintain SpO2 between 90-92% while exercising. Note: For any SpO2 at 35% see policy and procedure for possible qualifications.

## 2021-10-28 ENCOUNTER — CARE COORDINATION (OUTPATIENT)
Dept: CASE MANAGEMENT | Age: 51
End: 2021-10-28

## 2021-10-28 NOTE — CARE COORDINATION
Care Transitions Outreach Attempt    Call within 2 business days of discharge: Yes   Attempted to reach patient for transitions of care follow up. Unable to reach patient. Patient: Ikre Perez Patient : 1970 MRN: [id number]    Last Discharge [facility]       Complaint Diagnosis Description Type Department Provider    10/20/21 Fatigue; Fever Hypoxemia . .. ED to Hosp-Admission (Discharged) (ADMITTED) Silvio Murcia MD; Swathi Contreras. ..            1 Call  [facility]; [phone]. Spoke with office, they are aware of referral but waiting for paperwork to come through. They as that you call once discharged to schedule apt.  2 Follow up with Jean-Claude Wallace MD (Family Medicine) in 1 week (11/3/2021)    D/C NOTES:  New onset DM no meds diet / lifestyle  home alone, did not qualify for home O2  OP referral to pulm for possible cpa    Was this an external facility discharge? No Discharge Facility: [facility]    Noted following upcoming appointments from discharge chart review:   [facility] follow up appointment(s): No future appointments.   Non-[facility] follow up appointment(s):

## 2021-10-29 ENCOUNTER — CARE COORDINATION (OUTPATIENT)
Dept: CASE MANAGEMENT | Age: 51
End: 2021-10-29

## 2021-10-29 NOTE — CARE COORDINATION
[facility] Transitions Initial Follow Up Call    Call within 2 business days of discharge: Yes    Patient: Sharon Ballard Patient : 1970   MRN: [id number]  Reason for Admission:  COVID-19  Discharge Date: 10/27/21 RARS: Readmission Risk Score: 6.2      Last Discharge [facility]       Complaint Diagnosis Description Type Department Provider    10/20/21 Fatigue; Fever Hypoxemia . .. ED to Hosp-Admission (Discharged) (ADMITTED) Victor Hugo Coles MD; Adenike Snyder. .. Spoke with: Kimberly Aldana states he is doing well at home, bored. Not used to being home and off. He is walking around the house, using stretchy bands hospital gave him and incentive spirometer. He does not have  A Pulse oximeter but states he is going to get one. Has ctn number if he needs anything. Declines assistance with PCP appt states he is going to make today. Patient contacted regarding COVID-19 diagnosis. Discussed COVID-19 related testing which was available at this time. Test results were positive. Patient informed of results, if available? Pt aware. Care Transition Nurse contacted the patient by telephone to perform post discharge assessment. Call within 2 business days of discharge: Yes. Verified name and  with patient as identifiers. Provided introduction to self, and explanation of the CTN/ACM role, and reason for call due to risk factors for infection and/or exposure to COVID-19. Symptoms reviewed with patient who verbalized the following symptoms: no new symptoms and no worsening symptoms. Due to no new or worsening symptoms encounter was not routed to provider for escalation. Discussed follow-up appointments. If no appointment was previously scheduled, appointment scheduling offered: Yes.   [facility] follow up appointment(s):   Future Appointments   Date Time Provider Lynda Garnett   2022  1:15 PM Alessio Ceja  [address] [facility] follow up appointment(s): Eliud Hart RN

## 2021-11-05 ENCOUNTER — CARE COORDINATION (OUTPATIENT)
Dept: CASE MANAGEMENT | Age: 51
End: 2021-11-05

## 2021-11-05 NOTE — CARE COORDINATION
[facility] Transitions Initial Follow Up Call    Call within 2 business days of discharge: Yes    Patient: Agnes Vergara Patient : 1970   MRN: [id number]  Reason for Admission:  COVID-19  Discharge Date: 10/27/21 RARS: Readmission Risk Score: 6.2      Last Discharge [facility]       Complaint Diagnosis Description Type Department Provider    10/20/21 Fatigue; Fever Hypoxemia . .. ED to Hosp-Admission (Discharged) (ADMITTED) Juliana Knox MD; Jean Marie Soares. .. Spoke with: Mikala Cronin states he is doing well at home, more energy, better appetite, did get a pulse ox reading 94% drops lower with activity but he sits and rest and it pops back up. Going to PCP appt soon. Discussed new onset DM diagnosis states they didn't say much about it but we reviewed it's more diet / lifestyle changes. Will review next week as well based on PCP notes. No other questions or concerns at this time. Still doing IS. Patient contacted regarding COVID-19 diagnosis. Discussed COVID-19 related testing which was available at this time. Test results were positive. Patient informed of results, if available? Pt aware. Care Transition Nurse contacted the patient by telephone to perform post discharge assessment. Call within 2 business days of discharge: Yes. Verified name and  with patient as identifiers. Provided introduction to self, and explanation of the CTN/ACM role, and reason for call due to risk factors for infection and/or exposure to COVID-19. Symptoms reviewed with patient who verbalized the following symptoms: no new symptoms and no worsening symptoms. Due to no new or worsening symptoms encounter was not routed to provider for escalation. Discussed follow-up appointments. If no appointment was previously scheduled, appointment scheduling offered: Yes.   [facility] follow up appointment(s):   Future Appointments   Date Time Provider Lynda Garnett   2021  2:00 PM Nura KIM MD MARK CabralesD [facility]   1/12/2022  1:15 PM MD Vanessa Menjivar [facility] follow up appointment(s):      Non-face-to-face services provided:  Obtained and reviewed discharge summary and/or continuity of care documents     Advance Care Planning:   Does patient have an Advance Directive:  reviewed and current. Educated patient about risk for severe COVID-19 due to risk factors according to CDC guidelines. CTN reviewed discharge instructions, medical action plan and red flag symptoms with the patient who verbalized understanding. Discussed COVID vaccination status: No. Education provided on COVID-19 vaccination as appropriate. Discussed exposure protocols and quarantine with CDC Guidelines. Patient was given an opportunity to verbalize any questions and concerns and agrees to contact CTN or health care provider for questions related to their healthcare. Reviewed and educated patient on any new and changed medications related to discharge diagnosis     Was patient discharged with a pulse oximeter? No Discussed and confirmed pulse oximeter discharge instructions and when to notify provider or seek emergency care. CTN provided contact information. Plan for follow-up call in 5-7 days based on severity of symptoms and risk factors.     Non-face-to-face services provided:  Obtained and reviewed discharge summary and/or continuity of care documents    Care Transitions 24 Hour Call    Do you have any ongoing symptoms?: No  Do you have all of your prescriptions and are they filled?: Yes  Were you discharged with any Home Care or Post Acute Services: No  Care Transitions Interventions         Follow Up  Future Appointments   Date Time Provider Lynda Garnett   11/5/2021  2:00 PM MD MARK HernandezD [facility]   1/12/2022  1:15 PM MD Vanessa Menjivar - David Ceja RN

## 2021-11-10 ENCOUNTER — CARE COORDINATION (OUTPATIENT)
Dept: CASE MANAGEMENT | Age: 51
End: 2021-11-10

## 2021-11-10 NOTE — CARE COORDINATION
[facility] Transitions Initial Follow Up Call    Call within 2 business days of discharge: Yes    Patient: Karolyn Client Patient : 1970   MRN: [id number]  Reason for Admission:  COVID-19  Discharge Date: 10/27/21 RARS: Readmission Risk Score: 6.2      Last Discharge [facility]       Complaint Diagnosis Description Type Department Provider    10/20/21 Fatigue; Fever Hypoxemia . .. ED to Hosp-Admission (Discharged) (ADMITTED) Patricia Cosby MD; She Stewart. .. Spoke with: Kit Schultz back to work this week. Went to PCP Monday things are going well. SPO2 still in 93-94% denies questions or concerns. Last CTN call. Patient contacted regarding COVID-19 diagnosis. Discussed COVID-19 related testing which was available at this time. Test results were positive. Patient informed of results, if available? Pt aware. Care Transition Nurse contacted the patient by telephone to perform post discharge assessment. Call within 2 business days of discharge: Yes. Verified name and  with patient as identifiers. Provided introduction to self, and explanation of the CTN/ACM role, and reason for call due to risk factors for infection and/or exposure to COVID-19. Symptoms reviewed with patient who verbalized the following symptoms: no new symptoms and no worsening symptoms. Due to no new or worsening symptoms encounter was not routed to provider for escalation. Discussed follow-up appointments. If no appointment was previously scheduled, appointment scheduling offered: Yes. [facility] follow up appointment(s):   Future Appointments   Date Time Provider Lynda Garnett   2022  1:15 PM Jefry Rocha MD [facility]     [facility] follow up appointment(s):      Non-face-to-face services provided:  Obtained and reviewed discharge summary and/or continuity of care documents     Advance Care Planning:   Does patient have an Advance Directive:  reviewed and current. Educated patient about risk for severe COVID-19 due to risk factors according to CDC guidelines. CTN reviewed discharge instructions, medical action plan and red flag symptoms with the patient who verbalized understanding. Discussed COVID vaccination status: No. Education provided on COVID-19 vaccination as appropriate. Discussed exposure protocols and quarantine with CDC Guidelines. Patient was given an opportunity to verbalize any questions and concerns and agrees to contact CTN or health care provider for questions related to their healthcare. Reviewed and educated patient on any new and changed medications related to discharge diagnosis     Was patient discharged with a pulse oximeter? No Discussed and confirmed pulse oximeter discharge instructions and when to notify provider or seek emergency care. CTN provided contact information. No further follow-up call identified based on severity of symptoms and risk factors.     Non-face-to-face services provided:  Obtained and reviewed discharge summary and/or continuity of care documents    Care Transitions 24 Hour Call    Schedule Follow Up Appointment with PCP: Completed  Do you have any ongoing symptoms?: No  Do you have all of your prescriptions and are they filled?: Yes  Were you discharged with any Home Care or Post Acute Services: No  Care Transitions Interventions  No Identified Needs         Follow Up  Future Appointments   Date Time Provider Lynda Garnett   1/12/2022  1:15 PM MD [facility] Alonso Juarez RN 55 year old M no PMH presents with head pressure x 1 week. Most likely tension HA based on symptomology and exam, but patient very concerned and requesting CT head. Will obtain CTH. Does not want any pain medication at this time. If negative, will d/c with neurology/spine institute f/u.

## 2025-05-09 ENCOUNTER — APPOINTMENT (OUTPATIENT)
Dept: INTERVENTIONAL RADIOLOGY/VASCULAR | Age: 55
DRG: 208 | End: 2025-05-09
Payer: COMMERCIAL

## 2025-05-09 ENCOUNTER — APPOINTMENT (OUTPATIENT)
Dept: GENERAL RADIOLOGY | Age: 55
DRG: 208 | End: 2025-05-09
Payer: COMMERCIAL

## 2025-05-09 ENCOUNTER — HOSPITAL ENCOUNTER (INPATIENT)
Age: 55
LOS: 6 days | Discharge: HOME | DRG: 208 | End: 2025-05-15
Attending: FAMILY MEDICINE | Admitting: INTERNAL MEDICINE
Payer: COMMERCIAL

## 2025-05-09 ENCOUNTER — APPOINTMENT (OUTPATIENT)
Age: 55
DRG: 208 | End: 2025-05-09
Payer: COMMERCIAL

## 2025-05-09 ENCOUNTER — APPOINTMENT (OUTPATIENT)
Dept: CT IMAGING | Age: 55
DRG: 208 | End: 2025-05-09
Payer: COMMERCIAL

## 2025-05-09 DIAGNOSIS — R65.10 SIRS (SYSTEMIC INFLAMMATORY RESPONSE SYNDROME) (HCC): ICD-10-CM

## 2025-05-09 DIAGNOSIS — I50.33 HEART FAILURE, DIASTOLIC, WITH ACUTE DECOMPENSATION (HCC): ICD-10-CM

## 2025-05-09 DIAGNOSIS — R06.02 SHORTNESS OF BREATH: ICD-10-CM

## 2025-05-09 DIAGNOSIS — R60.9 EDEMA, UNSPECIFIED TYPE: ICD-10-CM

## 2025-05-09 DIAGNOSIS — R09.02 HYPOXEMIA: ICD-10-CM

## 2025-05-09 DIAGNOSIS — E66.2 OBESITY HYPOVENTILATION SYNDROME (HCC): ICD-10-CM

## 2025-05-09 DIAGNOSIS — R07.9 CHEST PAIN: ICD-10-CM

## 2025-05-09 DIAGNOSIS — I27.81 COR PULMONALE (HCC): ICD-10-CM

## 2025-05-09 DIAGNOSIS — R79.89 ELEVATED TROPONIN: ICD-10-CM

## 2025-05-09 DIAGNOSIS — J18.9 PNEUMONIA OF RIGHT LOWER LOBE DUE TO INFECTIOUS ORGANISM: ICD-10-CM

## 2025-05-09 DIAGNOSIS — J96.02 ACUTE RESPIRATORY FAILURE WITH HYPOXIA AND HYPERCAPNIA (HCC): Primary | ICD-10-CM

## 2025-05-09 DIAGNOSIS — I50.43 CHF (CONGESTIVE HEART FAILURE), NYHA CLASS I, ACUTE ON CHRONIC, COMBINED (HCC): ICD-10-CM

## 2025-05-09 DIAGNOSIS — J96.01 ACUTE RESPIRATORY FAILURE WITH HYPOXIA AND HYPERCAPNIA (HCC): Primary | ICD-10-CM

## 2025-05-09 LAB
ACINETOBACTER CALCOACETICUS-BAUMANNII BY PCR: NOT DETECTED
ALBUMIN SERPL BCP-MCNC: 2.9 GM/DL (ref 3.4–5)
ALP SERPL-CCNC: 101 U/L (ref 46–116)
ALT SERPL W P-5'-P-CCNC: 203 U/L (ref 14–63)
AMMONIA PLAS-MCNC: 46 UMOL/L (ref 16–60)
ANION GAP SERPL CALC-SCNC: 3 MEQ/L (ref 8–16)
ANION GAP SERPL CALC-SCNC: 9 MEQ/L (ref 8–16)
APTT PPP: 32.5 SECONDS (ref 22–38)
ARTERIAL PATENCY WRIST A: ABNORMAL
AST SERPL W P-5'-P-CCNC: 161 U/L (ref 15–37)
BASE EXCESS BLDA CALC-SCNC: 11.8 MMOL/L (ref -2.5–2.5)
BASE EXCESS BLDA CALC-SCNC: 3.3 MMOL/L (ref -2–3)
BASOPHILS # BLD: 0.3 % (ref 0–3)
BASOPHILS ABSOLUTE: 0 THOU/MM3 (ref 0–0.1)
BASOPHILS ABSOLUTE: 0.1 THOU/MM3 (ref 0–0.1)
BASOPHILS NFR BLD AUTO: 0.4 %
BDY SITE: ABNORMAL
BILIRUB SERPL-MCNC: 1.1 MG/DL (ref 0.2–1)
BLACTX-M ISLT/SPM QL: NORMAL
BLAIMP ISLT/SPM QL: NORMAL
BLAKPC ISLT/SPM QL: NORMAL
BLAOXA-48-LIKE ISLT/SPM QL: NORMAL
BLAVIM ISLT/SPM QL: NORMAL
BREATHS SETTING VENT: 20 BPM
BUN SERPL-MCNC: 24 MG/DL (ref 8–23)
BUN SERPL-MCNC: 28 MG/DL (ref 7–18)
C PNEUM DNA LOWER RESP QL NAA+NON-PROBE: NOT DETECTED
CA-I BLD ISE-SCNC: 1.21 MMOL/L (ref 1.12–1.32)
CALCIUM SERPL-MCNC: 8.5 MG/DL (ref 8.6–10)
CALCIUM SERPL-MCNC: 8.6 MG/DL (ref 8.5–10.1)
CHLORIDE BLD-SCNC: 94 MEQ/L (ref 98–109)
CHLORIDE SERPL-SCNC: 101 MEQ/L (ref 98–107)
CHLORIDE SERPL-SCNC: 96 MEQ/L (ref 98–111)
CO2 SERPL-SCNC: 35 MEQ/L (ref 22–29)
CO2 SERPL-SCNC: 36 MEQ/L (ref 21–32)
COLLECTED BY:: ABNORMAL
COLLECTED BY:: ABNORMAL
CREAT SERPL-MCNC: 1.1 MG/DL (ref 0.7–1.2)
CREAT SERPL-MCNC: 1.3 MG/DL (ref 0.6–1.3)
CREAT UR-MCNC: 116 MG/DL
DEPRECATED MEAN GLUCOSE BLD GHB EST-ACNC: 141 MG/DL (ref 70–126)
DEPRECATED RDW RBC AUTO: 56.5 FL (ref 35–45)
DEVICE: ABNORMAL
DEVICE: ABNORMAL
ECHO AO ASC DIAM: 3.3 CM
ECHO AO ASCENDING AORTA INDEX: 1.15 CM/M2
ECHO AV CUSP MM: 2.5 CM
ECHO AV PEAK GRADIENT: 8 MMHG
ECHO AV PEAK VELOCITY: 1.4 M/S
ECHO AV VELOCITY RATIO: 0.43
ECHO BSA: 3.09 M2
ECHO BSA: 3.09 M2
ECHO EST RA PRESSURE: 15 MMHG
ECHO LA DIAMETER INDEX: 1.46 CM/M2
ECHO LA DIAMETER: 4.2 CM
ECHO LV EF PHYSICIAN: 50 %
ECHO LV FRACTIONAL SHORTENING: 27 % (ref 28–44)
ECHO LV INTERNAL DIMENSION DIASTOLE INDEX: 1.77 CM/M2
ECHO LV INTERNAL DIMENSION DIASTOLIC: 5.1 CM (ref 4.2–5.9)
ECHO LV INTERNAL DIMENSION SYSTOLIC INDEX: 1.28 CM/M2
ECHO LV INTERNAL DIMENSION SYSTOLIC: 3.7 CM
ECHO LV IVSD: 1 CM (ref 0.6–1)
ECHO LV MASS 2D: 200.8 G (ref 88–224)
ECHO LV MASS INDEX 2D: 69.7 G/M2 (ref 49–115)
ECHO LV POSTERIOR WALL DIASTOLIC: 1.1 CM (ref 0.6–1)
ECHO LV RELATIVE WALL THICKNESS RATIO: 0.43
ECHO LVOT PEAK GRADIENT: 1 MMHG
ECHO LVOT PEAK VELOCITY: 0.6 M/S
ECHO MV A VELOCITY: 0.72 M/S
ECHO MV E DECELERATION TIME (DT): 264 MS
ECHO MV E VELOCITY: 0.39 M/S
ECHO MV E/A RATIO: 0.54
ECHO PV MAX VELOCITY: 0.8 M/S
ECHO PV PEAK GRADIENT: 2 MMHG
ECHO RIGHT VENTRICULAR SYSTOLIC PRESSURE (RVSP): 41 MMHG
ECHO RV GLOBAL SYSTOLIC STRAIN (GLS): -5.1 %
ECHO RV INTERNAL DIMENSION: 5.3 CM
ECHO TV E WAVE: 0.5 M/S
ECHO TV REGURGITANT MAX VELOCITY: 2.57 M/S
ECHO TV REGURGITANT PEAK GRADIENT: 26 MMHG
EKG ATRIAL RATE: 75 BPM
EKG ATRIAL RATE: 89 BPM
EKG P AXIS: 46 DEGREES
EKG P AXIS: 60 DEGREES
EKG P-R INTERVAL: 154 MS
EKG P-R INTERVAL: 162 MS
EKG Q-T INTERVAL: 364 MS
EKG Q-T INTERVAL: 424 MS
EKG QRS DURATION: 96 MS
EKG QRS DURATION: 96 MS
EKG QTC CALCULATION (BAZETT): 406 MS
EKG QTC CALCULATION (BAZETT): 515 MS
EKG R AXIS: -160 DEGREES
EKG R AXIS: -173 DEGREES
EKG T AXIS: 24 DEGREES
EKG T AXIS: 26 DEGREES
EKG VENTRICULAR RATE: 75 BPM
EKG VENTRICULAR RATE: 89 BPM
ENTEROBACTER CLOACAE COMPLEX BY PCR: NOT DETECTED
EOSINOPHIL NFR BLD AUTO: 0.1 %
EOSINOPHILS ABSOLUTE: 0 THOU/MM3 (ref 0–0.4)
EOSINOPHILS ABSOLUTE: 0 THOU/MM3 (ref 0–0.5)
EOSINOPHILS RELATIVE PERCENT: 0.1 % (ref 0–4)
ERYTHROCYTE [DISTWIDTH] IN BLOOD BY AUTOMATED COUNT: 17 % (ref 11.5–14.5)
ESCHERICHIA COLI BY PCR: NOT DETECTED
FIO2 ON VENT O2 ANALYZER: 44 %
FIO2 ON VENT O2 ANALYZER: 70 %
FLUAV RNA LOWER RESP QL NAA+NON-PROBE: NOT DETECTED
FLUBV RNA LOWER RESP QL NAA+NON-PROBE: NOT DETECTED
GFR SERPL CREATININE-BSD FRML MDRD: 66 ML/MIN/1.73M2
GFR SERPL CREATININE-BSD FRML MDRD: 81 ML/MIN/1.73M2
GLUCOSE BLD-MCNC: 130 MG/DL (ref 70–108)
GLUCOSE BLD-MCNC: 146 MG/DL (ref 70–108)
GLUCOSE SERPL-MCNC: 125 MG/DL (ref 74–109)
GLUCOSE SERPL-MCNC: 134 MG/DL (ref 74–106)
HADV DNA LOWER RESP QL NAA+NON-PROBE: NOT DETECTED
HAEMOPHILUS INFLUENZAE BY PCR: NOT DETECTED
HBA1C MFR BLD HPLC: 6.7 % (ref 4–6)
HCO3 BLDA-SCNC: 42 MMOL/L (ref 23–28)
HCO3 BLDA-SCNC: 42 MMOL/L (ref 23–28)
HCOV RNA LOWER RESP QL NAA+NON-PROBE: NOT DETECTED
HCT VFR BLD AUTO: 61 % (ref 42–52)
HCT VFR BLD CALC: 60.4 % (ref 42–52)
HEMOGLOBIN: 17.9 GM/DL (ref 14–18)
HEPARIN UNFRACTIONATED: < 0.04 U/ML (ref 0.3–0.7)
HGB BLD-MCNC: 18 GM/DL (ref 14–18)
HMPV RNA LOWER RESP QL NAA+NON-PROBE: NOT DETECTED
HPIV RNA LOWER RESP QL NAA+NON-PROBE: NOT DETECTED
IMM GRANULOCYTES # BLD AUTO: 0.16 THOU/MM3 (ref 0–0.07)
IMM GRANULOCYTES NFR BLD AUTO: 1.3 %
IMMATURE GRANS (ABS): 0 THOU/MM3 (ref 0–0.07)
IMMATURE GRANULOCYTES %: 0 %
INR PPP: 1.46 (ref 0.85–1.13)
KLEBSIELLA AEROGENES BY PCR: NOT DETECTED
KLEBSIELLA OXYTOCA BY PCR: NOT DETECTED
KLEBSIELLA PNEUMONIAE GROUP BY PCR: NOT DETECTED
L PNEUMO DNA LOWER RESP QL NAA+NON-PROBE: NOT DETECTED
LACTATE BLD-SCNC: 1.4 MMOL/L (ref 0.5–1.9)
LACTATE SERPL-SCNC: 1.3 MMOL/L (ref 0.5–2)
LACTATE: 2.5 MMOL/L (ref 0.9–1.7)
LACTIC ACID, SEPSIS: 1.4 MMOL/L (ref 0.5–1.9)
LYMPHOCYTES # BLD AUTO: 13.9 % (ref 15–47)
LYMPHOCYTES ABSOLUTE: 1.1 THOU/MM3 (ref 1–4.8)
LYMPHOCYTES ABSOLUTE: 1.4 THOU/MM3 (ref 1–4.8)
LYMPHOCYTES NFR BLD AUTO: 8.8 %
M PNEUMO DNA LOWER RESP QL NAA+NON-PROBE: NOT DETECTED
MAGNESIUM SERPL-MCNC: 2.3 MG/DL (ref 1.6–2.6)
MCH RBC QN AUTO: 28.8 PG (ref 26–33)
MCH RBC QN AUTO: 28.9 PG (ref 26–32)
MCHC RBC AUTO-ENTMCNC: 29.5 GM/DL (ref 32.2–35.5)
MCHC RBC AUTO-ENTMCNC: 29.6 GM/DL (ref 31–35)
MCV RBC AUTO: 97.4 FL (ref 80–94)
MCV RBC AUTO: 97.6 FL (ref 80–94)
MONOCYTES ABSOLUTE: 0.9 THOU/MM3 (ref 0.4–1.3)
MONOCYTES NFR BLD AUTO: 7.1 %
MONOCYTES: 0.8 THOU/MM3 (ref 0.3–1.3)
MONOCYTES: 7.8 % (ref 0–12)
MORAXELLA CATARRHALIS BY PCR: NOT DETECTED
NEUTROPHILS ABSOLUTE: 10.5 THOU/MM3 (ref 1.8–7.7)
NEUTROPHILS ABSOLUTE: 7.8 THOU/MM3 (ref 1.8–7.7)
NEUTROPHILS NFR BLD AUTO: 82.3 %
NRBC BLD AUTO-RTO: 2 /100 WBC
NT PRO BNP: 6812 PG/ML (ref 0–125)
PCO2 BLDA: 70 MMHG (ref 35–45)
PCO2 TEMP ADJ BLDMV: 131 MMHG (ref 41–51)
PDW BLD-RTO: 17 % (ref 11.5–14.9)
PEEP SETTING VENT: 12 MMHG
PH BLDA: 7.39 [PH] (ref 7.35–7.45)
PH BLDMV: 7.11 [PH] (ref 7.31–7.41)
PIP: 32 CMH2O
PLATELET # BLD AUTO: 206 THOU/MM3 (ref 130–400)
PLATELET # BLD AUTO: 207 THOU/MM3 (ref 130–400)
PMV BLD AUTO: 8.9 FL (ref 9.4–12.4)
PMV BLD AUTO: 9 FL (ref 9.4–12.4)
PO2 BLDA: 94 MMHG (ref 71–104)
PO2 BLDMV: 75 MMHG (ref 25–40)
POC CREATININE WHOLE BLOOD: 1 MG/DL (ref 0.5–1.2)
POTASSIUM BLD-SCNC: 5 MEQ/L (ref 3.5–4.9)
POTASSIUM SERPL-SCNC: 4.4 MEQ/L (ref 3.5–5.1)
POTASSIUM SERPL-SCNC: 4.5 MEQ/L (ref 3.5–5.2)
PROT SERPL-MCNC: 7.1 GM/DL (ref 6.4–8.2)
PROTEUS SPECIES BY PCR: NOT DETECTED
PSEUDOMONAS AERUGINOSA BY PCR: NOT DETECTED
RBC # BLD AUTO: 6.26 MILL/MM3 (ref 4.7–6.1)
RBC # BLD: 6.19 MILL/MM3 (ref 4.5–6.1)
RESISTANT GENE MECA/C & MREJ BY PCR: NORMAL
RESISTANT GENE NDM BY PCR: NORMAL
RSV RNA LOWER RESP QL NAA+NON-PROBE: NOT DETECTED
RV+EV RNA LOWER RESP QL NAA+NON-PROBE: NOT DETECTED
SAO2 % BLDA: 97 %
SAO2 % BLDMV: 86 %
SEG NEUTROPHILS: 77.6 % (ref 43–75)
SERRATIA MARCESCENS BY PCR: NOT DETECTED
SITE: ABNORMAL
SODIUM BLD-SCNC: 144 MEQ/L (ref 138–146)
SODIUM SERPL-SCNC: 140 MEQ/L (ref 135–145)
SODIUM SERPL-SCNC: 140 MEQ/L (ref 136–145)
SODIUM UR-SCNC: < 20 MEQ/L
SOURCE: NORMAL
SPECIMEN ACCEPTABILITY: NORMAL
STAPH AUREUS BY PCR: NOT DETECTED
STREP AGALACTIAE BY PCR: NOT DETECTED
STREP PNEUMONIAE BY PCR: NOT DETECTED
STREP PYOGENES BY PCR: NOT DETECTED
TROPONIN, HIGH SENSITIVITY: 51 NG/L (ref 0–12)
TROPONIN, HIGH SENSITIVITY: 53 NG/L (ref 0–12)
TROPONIN, HIGH SENSITIVITY: 732.4 PG/ML (ref 0–76.1)
UUN 24H UR-MCNC: 963 MG/DL
VENTILATION MODE VENT: ABNORMAL
VENTILATION MODE VENT: ABNORMAL
WBC # BLD AUTO: 12.7 THOU/MM3 (ref 4.8–10.8)
WBC # BLD: 10.1 THOU/MM3 (ref 4.8–10.8)

## 2025-05-09 PROCEDURE — 93306 TTE W/DOPPLER COMPLETE: CPT | Performed by: INTERNAL MEDICINE

## 2025-05-09 PROCEDURE — 84484 ASSAY OF TROPONIN QUANT: CPT

## 2025-05-09 PROCEDURE — 02HV33Z INSERTION OF INFUSION DEVICE INTO SUPERIOR VENA CAVA, PERCUTANEOUS APPROACH: ICD-10-PCS | Performed by: INTERNAL MEDICINE

## 2025-05-09 PROCEDURE — 96374 THER/PROPH/DIAG INJ IV PUSH: CPT

## 2025-05-09 PROCEDURE — 94640 AIRWAY INHALATION TREATMENT: CPT

## 2025-05-09 PROCEDURE — 6360000002 HC RX W HCPCS

## 2025-05-09 PROCEDURE — 93970 EXTREMITY STUDY: CPT

## 2025-05-09 PROCEDURE — 87040 BLOOD CULTURE FOR BACTERIA: CPT

## 2025-05-09 PROCEDURE — 87631 RESP VIRUS 3-5 TARGETS: CPT

## 2025-05-09 PROCEDURE — 36415 COLL VENOUS BLD VENIPUNCTURE: CPT

## 2025-05-09 PROCEDURE — 82803 BLOOD GASES ANY COMBINATION: CPT

## 2025-05-09 PROCEDURE — 87205 SMEAR GRAM STAIN: CPT

## 2025-05-09 PROCEDURE — 93306 TTE W/DOPPLER COMPLETE: CPT

## 2025-05-09 PROCEDURE — 31500 INSERT EMERGENCY AIRWAY: CPT

## 2025-05-09 PROCEDURE — 82570 ASSAY OF URINE CREATININE: CPT

## 2025-05-09 PROCEDURE — 6370000000 HC RX 637 (ALT 250 FOR IP): Performed by: INTERNAL MEDICINE

## 2025-05-09 PROCEDURE — 84300 ASSAY OF URINE SODIUM: CPT

## 2025-05-09 PROCEDURE — 80053 COMPREHEN METABOLIC PANEL: CPT

## 2025-05-09 PROCEDURE — 82947 ASSAY GLUCOSE BLOOD QUANT: CPT

## 2025-05-09 PROCEDURE — 82435 ASSAY OF BLOOD CHLORIDE: CPT

## 2025-05-09 PROCEDURE — 83036 HEMOGLOBIN GLYCOSYLATED A1C: CPT

## 2025-05-09 PROCEDURE — 83605 ASSAY OF LACTIC ACID: CPT

## 2025-05-09 PROCEDURE — 99291 CRITICAL CARE FIRST HOUR: CPT | Performed by: INTERNAL MEDICINE

## 2025-05-09 PROCEDURE — 71045 X-RAY EXAM CHEST 1 VIEW: CPT

## 2025-05-09 PROCEDURE — 96375 TX/PRO/DX INJ NEW DRUG ADDON: CPT

## 2025-05-09 PROCEDURE — 85025 COMPLETE CBC W/AUTO DIFF WBC: CPT

## 2025-05-09 PROCEDURE — 6360000002 HC RX W HCPCS: Performed by: FAMILY MEDICINE

## 2025-05-09 PROCEDURE — 87070 CULTURE OTHR SPECIMN AEROBIC: CPT

## 2025-05-09 PROCEDURE — 0BH17EZ INSERTION OF ENDOTRACHEAL AIRWAY INTO TRACHEA, VIA NATURAL OR ARTIFICIAL OPENING: ICD-10-PCS | Performed by: INTERNAL MEDICINE

## 2025-05-09 PROCEDURE — 84295 ASSAY OF SERUM SODIUM: CPT

## 2025-05-09 PROCEDURE — 94002 VENT MGMT INPAT INIT DAY: CPT

## 2025-05-09 PROCEDURE — 93010 ELECTROCARDIOGRAM REPORT: CPT | Performed by: INTERNAL MEDICINE

## 2025-05-09 PROCEDURE — 84540 ASSAY OF URINE/UREA-N: CPT

## 2025-05-09 PROCEDURE — 83735 ASSAY OF MAGNESIUM: CPT

## 2025-05-09 PROCEDURE — 6360000002 HC RX W HCPCS: Performed by: STUDENT IN AN ORGANIZED HEALTH CARE EDUCATION/TRAINING PROGRAM

## 2025-05-09 PROCEDURE — 87486 CHLMYD PNEUM DNA AMP PROBE: CPT

## 2025-05-09 PROCEDURE — 93005 ELECTROCARDIOGRAM TRACING: CPT | Performed by: FAMILY MEDICINE

## 2025-05-09 PROCEDURE — 84132 ASSAY OF SERUM POTASSIUM: CPT

## 2025-05-09 PROCEDURE — 93005 ELECTROCARDIOGRAM TRACING: CPT

## 2025-05-09 PROCEDURE — 2500000003 HC RX 250 WO HCPCS: Performed by: FAMILY MEDICINE

## 2025-05-09 PROCEDURE — 87541 LEGION PNEUMO DNA AMP PROB: CPT

## 2025-05-09 PROCEDURE — 93356 MYOCRD STRAIN IMG SPCKL TRCK: CPT | Performed by: INTERNAL MEDICINE

## 2025-05-09 PROCEDURE — 71275 CT ANGIOGRAPHY CHEST: CPT

## 2025-05-09 PROCEDURE — 87798 DETECT AGENT NOS DNA AMP: CPT

## 2025-05-09 PROCEDURE — 83880 ASSAY OF NATRIURETIC PEPTIDE: CPT

## 2025-05-09 PROCEDURE — 85610 PROTHROMBIN TIME: CPT

## 2025-05-09 PROCEDURE — 89220 SPUTUM SPECIMEN COLLECTION: CPT

## 2025-05-09 PROCEDURE — 2580000003 HC RX 258: Performed by: FAMILY MEDICINE

## 2025-05-09 PROCEDURE — 36556 INSERT NON-TUNNEL CV CATH: CPT

## 2025-05-09 PROCEDURE — 85520 HEPARIN ASSAY: CPT

## 2025-05-09 PROCEDURE — 5A1935Z RESPIRATORY VENTILATION, LESS THAN 24 CONSECUTIVE HOURS: ICD-10-PCS | Performed by: INTERNAL MEDICINE

## 2025-05-09 PROCEDURE — 82330 ASSAY OF CALCIUM: CPT

## 2025-05-09 PROCEDURE — 82140 ASSAY OF AMMONIA: CPT

## 2025-05-09 PROCEDURE — 2000000000 HC ICU R&B

## 2025-05-09 PROCEDURE — 94660 CPAP INITIATION&MGMT: CPT

## 2025-05-09 PROCEDURE — 87581 M.PNEUMON DNA AMP PROBE: CPT

## 2025-05-09 PROCEDURE — 2580000003 HC RX 258

## 2025-05-09 PROCEDURE — 99285 EMERGENCY DEPT VISIT HI MDM: CPT

## 2025-05-09 PROCEDURE — 85730 THROMBOPLASTIN TIME PARTIAL: CPT

## 2025-05-09 PROCEDURE — 2500000003 HC RX 250 WO HCPCS

## 2025-05-09 PROCEDURE — 6360000004 HC RX CONTRAST MEDICATION: Performed by: STUDENT IN AN ORGANIZED HEALTH CARE EDUCATION/TRAINING PROGRAM

## 2025-05-09 PROCEDURE — 87081 CULTURE SCREEN ONLY: CPT

## 2025-05-09 PROCEDURE — 82565 ASSAY OF CREATININE: CPT

## 2025-05-09 RX ORDER — HEPARIN SODIUM 1000 [USP'U]/ML
10000 INJECTION, SOLUTION INTRAVENOUS; SUBCUTANEOUS ONCE
Status: COMPLETED | OUTPATIENT
Start: 2025-05-09 | End: 2025-05-09

## 2025-05-09 RX ORDER — FENTANYL CITRATE-0.9 % NACL/PF 10 MCG/ML
25-200 PLASTIC BAG, INJECTION (ML) INTRAVENOUS CONTINUOUS
Refills: 0 | Status: DISCONTINUED | OUTPATIENT
Start: 2025-05-09 | End: 2025-05-10

## 2025-05-09 RX ORDER — ONDANSETRON 2 MG/ML
4 INJECTION INTRAMUSCULAR; INTRAVENOUS EVERY 6 HOURS PRN
Status: DISCONTINUED | OUTPATIENT
Start: 2025-05-09 | End: 2025-05-15 | Stop reason: HOSPADM

## 2025-05-09 RX ORDER — ENOXAPARIN SODIUM 100 MG/ML
60 INJECTION SUBCUTANEOUS 2 TIMES DAILY
Status: DISCONTINUED | OUTPATIENT
Start: 2025-05-09 | End: 2025-05-15 | Stop reason: HOSPADM

## 2025-05-09 RX ORDER — ALBUTEROL SULFATE 0.83 MG/ML
10 SOLUTION RESPIRATORY (INHALATION) ONCE
Status: COMPLETED | OUTPATIENT
Start: 2025-05-09 | End: 2025-05-09

## 2025-05-09 RX ORDER — ETOMIDATE 2 MG/ML
20 INJECTION INTRAVENOUS ONCE
Status: COMPLETED | OUTPATIENT
Start: 2025-05-09 | End: 2025-05-09

## 2025-05-09 RX ORDER — ALBUTEROL SULFATE 0.83 MG/ML
2.5 SOLUTION RESPIRATORY (INHALATION) ONCE
Status: DISCONTINUED | OUTPATIENT
Start: 2025-05-09 | End: 2025-05-15 | Stop reason: HOSPADM

## 2025-05-09 RX ORDER — PANTOPRAZOLE SODIUM 40 MG/10ML
40 INJECTION, POWDER, LYOPHILIZED, FOR SOLUTION INTRAVENOUS DAILY
Status: DISCONTINUED | OUTPATIENT
Start: 2025-05-09 | End: 2025-05-14

## 2025-05-09 RX ORDER — HEPARIN SODIUM 1000 [USP'U]/ML
5000 INJECTION, SOLUTION INTRAVENOUS; SUBCUTANEOUS PRN
Status: DISCONTINUED | OUTPATIENT
Start: 2025-05-09 | End: 2025-05-09

## 2025-05-09 RX ORDER — HEPARIN SODIUM 1000 [USP'U]/ML
10000 INJECTION, SOLUTION INTRAVENOUS; SUBCUTANEOUS PRN
Status: DISCONTINUED | OUTPATIENT
Start: 2025-05-09 | End: 2025-05-09

## 2025-05-09 RX ORDER — IOPAMIDOL 755 MG/ML
80 INJECTION, SOLUTION INTRAVASCULAR
Status: COMPLETED | OUTPATIENT
Start: 2025-05-09 | End: 2025-05-09

## 2025-05-09 RX ORDER — 0.9 % SODIUM CHLORIDE 0.9 %
500 INTRAVENOUS SOLUTION INTRAVENOUS ONCE
Status: COMPLETED | OUTPATIENT
Start: 2025-05-09 | End: 2025-05-09

## 2025-05-09 RX ORDER — POLYETHYLENE GLYCOL 3350 17 G/17G
17 POWDER, FOR SOLUTION ORAL DAILY PRN
Status: DISCONTINUED | OUTPATIENT
Start: 2025-05-09 | End: 2025-05-15

## 2025-05-09 RX ORDER — ROCURONIUM BROMIDE 10 MG/ML
100 INJECTION, SOLUTION INTRAVENOUS ONCE
Status: COMPLETED | OUTPATIENT
Start: 2025-05-09 | End: 2025-05-09

## 2025-05-09 RX ORDER — CHLORHEXIDINE GLUCONATE ORAL RINSE 1.2 MG/ML
15 SOLUTION DENTAL 2 TIMES DAILY
Status: DISCONTINUED | OUTPATIENT
Start: 2025-05-09 | End: 2025-05-10

## 2025-05-09 RX ORDER — SODIUM CHLORIDE 0.9 % (FLUSH) 0.9 %
5-40 SYRINGE (ML) INJECTION PRN
Status: DISCONTINUED | OUTPATIENT
Start: 2025-05-09 | End: 2025-05-14 | Stop reason: SDUPTHER

## 2025-05-09 RX ORDER — SODIUM CHLORIDE 0.9 % (FLUSH) 0.9 %
5-40 SYRINGE (ML) INJECTION EVERY 12 HOURS SCHEDULED
Status: DISCONTINUED | OUTPATIENT
Start: 2025-05-09 | End: 2025-05-14 | Stop reason: SDUPTHER

## 2025-05-09 RX ORDER — SODIUM CHLORIDE 9 MG/ML
INJECTION, SOLUTION INTRAVENOUS PRN
Status: DISCONTINUED | OUTPATIENT
Start: 2025-05-09 | End: 2025-05-15 | Stop reason: HOSPADM

## 2025-05-09 RX ORDER — ACETAMINOPHEN 325 MG/1
650 TABLET ORAL EVERY 6 HOURS PRN
Status: DISCONTINUED | OUTPATIENT
Start: 2025-05-09 | End: 2025-05-14 | Stop reason: SDUPTHER

## 2025-05-09 RX ORDER — ALBUTEROL SULFATE 0.83 MG/ML
2.5 SOLUTION RESPIRATORY (INHALATION) EVERY 4 HOURS PRN
Status: DISCONTINUED | OUTPATIENT
Start: 2025-05-09 | End: 2025-05-15 | Stop reason: HOSPADM

## 2025-05-09 RX ORDER — POTASSIUM CHLORIDE 29.8 MG/ML
20 INJECTION INTRAVENOUS PRN
Status: DISCONTINUED | OUTPATIENT
Start: 2025-05-09 | End: 2025-05-15 | Stop reason: HOSPADM

## 2025-05-09 RX ORDER — ALBUTEROL SULFATE 0.83 MG/ML
2.5 SOLUTION RESPIRATORY (INHALATION) EVERY 4 HOURS PRN
Status: DISCONTINUED | OUTPATIENT
Start: 2025-05-09 | End: 2025-05-09

## 2025-05-09 RX ORDER — HEPARIN SODIUM 10000 [USP'U]/100ML
5-30 INJECTION, SOLUTION INTRAVENOUS CONTINUOUS
Status: DISCONTINUED | OUTPATIENT
Start: 2025-05-09 | End: 2025-05-09

## 2025-05-09 RX ORDER — MIDAZOLAM HYDROCHLORIDE 1 MG/ML
1-10 INJECTION, SOLUTION INTRAVENOUS CONTINUOUS
Status: DISCONTINUED | OUTPATIENT
Start: 2025-05-09 | End: 2025-05-10

## 2025-05-09 RX ORDER — ONDANSETRON 4 MG/1
4 TABLET, ORALLY DISINTEGRATING ORAL EVERY 8 HOURS PRN
Status: DISCONTINUED | OUTPATIENT
Start: 2025-05-09 | End: 2025-05-15 | Stop reason: HOSPADM

## 2025-05-09 RX ORDER — NOREPINEPHRINE BITARTRATE 0.06 MG/ML
1-100 INJECTION, SOLUTION INTRAVENOUS CONTINUOUS
Status: DISCONTINUED | OUTPATIENT
Start: 2025-05-09 | End: 2025-05-10

## 2025-05-09 RX ORDER — MAGNESIUM SULFATE IN WATER 40 MG/ML
2000 INJECTION, SOLUTION INTRAVENOUS PRN
Status: DISCONTINUED | OUTPATIENT
Start: 2025-05-09 | End: 2025-05-15 | Stop reason: HOSPADM

## 2025-05-09 RX ORDER — ACETAMINOPHEN 650 MG/1
650 SUPPOSITORY RECTAL EVERY 6 HOURS PRN
Status: DISCONTINUED | OUTPATIENT
Start: 2025-05-09 | End: 2025-05-15 | Stop reason: HOSPADM

## 2025-05-09 RX ORDER — POTASSIUM CHLORIDE 7.45 MG/ML
10 INJECTION INTRAVENOUS PRN
Status: DISCONTINUED | OUTPATIENT
Start: 2025-05-09 | End: 2025-05-15 | Stop reason: HOSPADM

## 2025-05-09 RX ADMIN — Medication 5 MCG/MIN: at 12:05

## 2025-05-09 RX ADMIN — BUMETANIDE 0.2 MG/HR: 0.25 INJECTION INTRAMUSCULAR; INTRAVENOUS at 15:12

## 2025-05-09 RX ADMIN — PANTOPRAZOLE SODIUM 40 MG: 40 INJECTION, POWDER, FOR SOLUTION INTRAVENOUS at 14:23

## 2025-05-09 RX ADMIN — ETOMIDATE 20 MG: 2 INJECTION INTRAVENOUS at 12:13

## 2025-05-09 RX ADMIN — Medication 50 MCG/HR: at 12:17

## 2025-05-09 RX ADMIN — ENOXAPARIN SODIUM 60 MG: 100 INJECTION SUBCUTANEOUS at 20:39

## 2025-05-09 RX ADMIN — SODIUM CHLORIDE 500 ML: 0.9 INJECTION, SOLUTION INTRAVENOUS at 10:12

## 2025-05-09 RX ADMIN — HEPARIN SODIUM 10000 UNITS: 1000 INJECTION INTRAVENOUS; SUBCUTANEOUS at 11:40

## 2025-05-09 RX ADMIN — 0.12% CHLORHEXIDINE GLUCONATE 15 ML: 1.2 RINSE ORAL at 20:39

## 2025-05-09 RX ADMIN — ROCURONIUM BROMIDE 100 MG: 10 INJECTION, SOLUTION INTRAVENOUS at 12:13

## 2025-05-09 RX ADMIN — Medication 2 MG/HR: at 12:18

## 2025-05-09 RX ADMIN — IOPAMIDOL 80 ML: 755 INJECTION, SOLUTION INTRAVENOUS at 11:34

## 2025-05-09 RX ADMIN — ALBUTEROL SULFATE 10 MG: 2.5 SOLUTION RESPIRATORY (INHALATION) at 15:14

## 2025-05-09 RX ADMIN — SODIUM CHLORIDE, PRESERVATIVE FREE 10 ML: 5 INJECTION INTRAVENOUS at 20:39

## 2025-05-09 RX ADMIN — WATER 2000 MG: 1 INJECTION INTRAMUSCULAR; INTRAVENOUS; SUBCUTANEOUS at 10:11

## 2025-05-09 RX ADMIN — HEPARIN SODIUM AND DEXTROSE 10 UNITS/KG/HR: 10000; 5 INJECTION INTRAVENOUS at 11:43

## 2025-05-09 ASSESSMENT — PULMONARY FUNCTION TESTS
PIF_VALUE: 31
PIF_VALUE: 30
PIF_VALUE: 29
PIF_VALUE: 31
PIF_VALUE: 31

## 2025-05-09 ASSESSMENT — PAIN - FUNCTIONAL ASSESSMENT: PAIN_FUNCTIONAL_ASSESSMENT: NONE - DENIES PAIN

## 2025-05-09 NOTE — PLAN OF CARE
Problem: Safety - Medical Restraint  Goal: Remains free of injury from restraints (Restraint for Interference with Medical Device)  Description: INTERVENTIONS:1. Determine that other, less restrictive measures have been tried or would not be effective before applying the restraint2. Evaluate the patient's condition at the time of restraint application3. Inform patient/family regarding the reason for restraint4. Q2H: Monitor safety, psychosocial status, comfort, nutrition and hydration  Outcome: Progressing  Flowsheets (Taken 5/9/2025 1914)  Remains free of injury from restraints (restraint for interference with medical device):   Determine that other, less restrictive measures have been tried or would not be effective before applying the restraint   Inform patient/family regarding the reason for restraint   Every 2 hours: Monitor safety, psychosocial status, comfort, nutrition and hydration   Evaluate the patient's condition at the time of restraint application   Care plan reviewed with patient.  Patient unable to verbalize understanding of the plan of care and contribute to goal setting d/t intubation/sedation. No family currently present. Care narrated.

## 2025-05-09 NOTE — H&P
CRITICAL CARE PROGRESS NOTE      Patient:  Nilson Hanson    Unit/Bed:01/001A  YOB: 1970  MRN: [id number]   PCP: Lai Irvin MD  Date of Admission: 5/9/2025  Chief Complaint:- Fatigue and shortness of breath     Assessment and Plan:    Acute hypoxic and hypercapnic respiratory failure: In ED patient put on BIPAP for 30 minutes and had worsening stupor and not responding to voice. VBG pH 7.11. Patient intubated on 05/09 for airway protection.   Sedation with 75mcg/hr fentanyl and 2mg/hr versed   Wean vent settings as tolerated  Lung protective strategies. GI prophylaxis IV 40mg Protonix    Albuterol nebs PRN  2. Acute heart failure exacerbation- BNP 6812. CXR shows cephalization and edema.   -Full ECHO ordered    -Diuresis with Bumex drip 0.25ml/hr   -Strict I/O and daily weights  3. Concern for ELY and obesity hypoventilation syndrome:  No previous sleep studies. Was awaiting appointment with [facility] sleep. Once sedation weaned to start on modafinil in the morning and progesterone in evening.    4. Ascites-Hepatomegaly, steatosis and ascites noted on CTA. , , , Total Bili 1.1. INR 1.46. Patient has positive fluid. Suspect due to CHF. Monitor with diuresis. Ammonia ordered.     5. Hypotension- Had 2 episodes of hypotension, likely sedation induced. Started on Levophed in ED.  On 3 of Levophed. Wean levophed to keep MAP >65.      6. JULIO- Cr 1.3. Last Cr 2021 0.8. Urine Na and creatinine ordered.     7. Concern for DVT:  Hx of chronic left leg edema from foot to hip. Noted in family medicine note. Had referral to vein clinic, but did not follow through. CTA ruled out PE. B/L Doppler ordered. Started on heparin due to concern for PE/DVT.   8. Elevated Troponin- Troponin 53. EKG did not show any ischemic changes. Likely demand in setting of acute heart failure exacerbation.   9. Type 2 Diabetes- HbA1c 2021 7.1. Was going to try to lose weight and diet for diabetes control. Repeat

## 2025-05-09 NOTE — ED NOTES
Presents to ED via [facility] from [facility] with complaints of worsening abdominal swelling and shortness of breath. Upon ED arrival pt fatigued and falls asleep quickly. Pt alert to person, but unable to answer place or time. Dr. Norman at bedside with ultrasound. EKG completed.

## 2025-05-09 NOTE — ED NOTES
EMS crew here and report given. Pt left with crew. Pt remains in critical condition. Pt alert and oriented. All pt cloths and phone sent with pt and crew.

## 2025-05-09 NOTE — ED PROVIDER NOTES
[facility] EMERGENCY DEPARTMENT  EMERGENCY DEPARTMENT ENCOUNTER          Pt Name: Nilson Hanson  MRN: [id number]  Birthdate 1970  Date of evaluation: 5/9/2025  Resident Physician: Lai Norman MD EM Resident PGY-3  Attending Physician: Delio Curry DO      CHIEF COMPLAINT       Chief Complaint   Patient presents with    Fatigue    Shortness of Breath         HISTORY OF PRESENT ILLNESS    HPI  Nilson Hanson is a 54 y.o. male who presents to the emergency department as a transfer from, [facility] by EMS for evaluation of fatigue, shortness of breath.    Patient reports fatigue and shortness of breath over the past 2 weeks worse over the past several days.  Patient initially seen at [place] ED was found to have acute respiratory failure transferred to [facility] ED on 6 L nasal cannula.    Upon arrival to ED patient is confused able to state name but unsure of place or year.  Patient's somnolent but arousable to voice initially.  Patient initially trialed on high flow nasal cannula however VBG showed PCO2 of 130s.  Patient placed on BiPAP for 30 minutes and taken for CTA chest due to having elevated troponin in the 700s at [place] high concern for PE patient was initially started on heparin bolus and infusion for likely DVT/PE with left lower extremity swelling.      The patient has no other acute complaints at this time.    ROS negative except as stated above.    PAST MEDICAL AND SURGICAL HISTORY   History reviewed. No pertinent past medical history.  History reviewed. No pertinent surgical history.      MEDICATIONS     Current Facility-Administered Medications:     heparin (porcine) injection 10,000 Units, 10,000 Units, IntraVENous, PRN, Delio Curry, DO    heparin (porcine) injection 5,000 Units, 5,000 Units, IntraVENous, PRN, Delio Curry, DO    heparin 25,000 units in dextrose 5% 250 mL (premix) infusion, 5-30 Units/kg/hr, IntraVENous, Continuous, Delio Curry, DO, Last Rate: 
Glucose 134 (*)     BUN 28 (*)     CO2 36 (*)      (*)     Albumin 2.9 (*)     Total Bilirubin 1.1 (*)      (*)     All other components within normal limits   TROPONIN - Abnormal; Notable for the following components:    Troponin, High Sensitivity 732.4 (*)     All other components within normal limits   BRAIN NATRIURETIC PEPTIDE - Abnormal; Notable for the following components:    NT Pro-BNP 6812.0 (*)     All other components within normal limits   ANION GAP - Abnormal; Notable for the following components:    Anion Gap 3.0 (*)     All other components within normal limits   GLOMERULAR FILTRATION RATE, ESTIMATED       EMERGENCY DEPARTMENT COURSE:   Vitals:    Vitals:    05/09/25 0946   BP: (!) 158/87   Pulse: 88   Resp: 20   Temp: 98 °F (36.7 °C)   TempSrc: Temporal   SpO2: (!) 68%     The patient struggles to communicate.  He is morbidly obese.  An EKG shows no acute ST elevation but does show some incomplete right bundle branch block.  Ultrasound evaluation does not show any evidence of B-lines when looked at 8 fields.  Long and short view as well as apical, and subxiphoid views seem to be a little bit limited secondary to the patient's body habitus.  I did not see any evidence of effusion I did not see a collapse of the right ventricle.  The IVC did not appear to collapse.  So there could be some proximal disease.  The patient's SpO2 upon arrival by nursing was 68%.  He was started on a nasal cannula as well as a nonrebreather which brought the patient up to around 9091%.  We did sit the patient up for maximum benefit.  But his body habitus seems to prevent any further upright movement.  the patient's body habitus large neck and evaluation show him to be a Mallampati 4 at the best circumstances.  He was not a patient where we felt confident doing an emergency airway in this person.  Rocephin 2 g was started secondary to concern of an elevated lactate, and patchy infiltrates on chest x-ray.  His

## 2025-05-10 ENCOUNTER — APPOINTMENT (OUTPATIENT)
Dept: ULTRASOUND IMAGING | Age: 55
DRG: 208 | End: 2025-05-10
Payer: COMMERCIAL

## 2025-05-10 ENCOUNTER — APPOINTMENT (OUTPATIENT)
Dept: GENERAL RADIOLOGY | Age: 55
DRG: 208 | End: 2025-05-10
Payer: COMMERCIAL

## 2025-05-10 LAB
ALBUMIN SERPL BCG-MCNC: 3 G/DL (ref 3.4–4.9)
ALP SERPL-CCNC: 83 U/L (ref 40–129)
ALT SERPL W/O P-5'-P-CCNC: 171 U/L (ref 10–50)
ANION GAP SERPL CALC-SCNC: 10 MEQ/L (ref 8–16)
ANION GAP SERPL CALC-SCNC: 13 MEQ/L (ref 8–16)
ARTERIAL PATENCY WRIST A: POSITIVE
AST SERPL-CCNC: 107 U/L (ref 10–50)
BASE EXCESS BLDA CALC-SCNC: 18.9 MMOL/L (ref -2.5–2.5)
BASE EXCESS BLDA CALC-SCNC: 19.1 MMOL/L (ref -2.5–2.5)
BASOPHILS ABSOLUTE: 0 THOU/MM3 (ref 0–0.1)
BASOPHILS NFR BLD AUTO: 0.3 %
BDY SITE: ABNORMAL
BDY SITE: ABNORMAL
BILIRUB CONJ SERPL-MCNC: 0.5 MG/DL (ref 0–0.2)
BILIRUB SERPL-MCNC: 0.9 MG/DL (ref 0.3–1.2)
BUN SERPL-MCNC: 18 MG/DL (ref 8–23)
BUN SERPL-MCNC: 22 MG/DL (ref 8–23)
CALCIUM SERPL-MCNC: 8.4 MG/DL (ref 8.6–10)
CALCIUM SERPL-MCNC: 8.7 MG/DL (ref 8.6–10)
CHLORIDE SERPL-SCNC: 89 MEQ/L (ref 98–111)
CHLORIDE SERPL-SCNC: 96 MEQ/L (ref 98–111)
CO2 SERPL-SCNC: 37 MEQ/L (ref 22–29)
CO2 SERPL-SCNC: 41 MEQ/L (ref 22–29)
COLLECTED BY:: ABNORMAL
COLLECTED BY:: ABNORMAL
CREAT SERPL-MCNC: 0.9 MG/DL (ref 0.7–1.2)
CREAT SERPL-MCNC: 1 MG/DL (ref 0.7–1.2)
DEPRECATED RDW RBC AUTO: 54.7 FL (ref 35–45)
DEVICE: ABNORMAL
DEVICE: ABNORMAL
EOSINOPHIL NFR BLD AUTO: 0.6 %
EOSINOPHILS ABSOLUTE: 0.1 THOU/MM3 (ref 0–0.4)
ERYTHROCYTE [DISTWIDTH] IN BLOOD BY AUTOMATED COUNT: 16 % (ref 11.5–14.5)
FIO2 ON VENT O2 ANALYZER: 40 %
GFR SERPL CREATININE-BSD FRML MDRD: 90 ML/MIN/1.73M2
GFR SERPL CREATININE-BSD FRML MDRD: > 90 ML/MIN/1.73M2
GLUCOSE BLD-MCNC: 82 MG/DL (ref 70–108)
GLUCOSE SERPL-MCNC: 90 MG/DL (ref 74–109)
GLUCOSE SERPL-MCNC: 92 MG/DL (ref 74–109)
HCO3 BLDA-SCNC: 50 MMOL/L (ref 23–28)
HCO3 BLDA-SCNC: 51 MMOL/L (ref 23–28)
HCT VFR BLD AUTO: 55.6 % (ref 42–52)
HGB BLD-MCNC: 16.8 GM/DL (ref 14–18)
IMM GRANULOCYTES # BLD AUTO: 0.04 THOU/MM3 (ref 0–0.07)
IMM GRANULOCYTES NFR BLD AUTO: 0.4 %
LYMPHOCYTES ABSOLUTE: 1 THOU/MM3 (ref 1–4.8)
LYMPHOCYTES NFR BLD AUTO: 9.3 %
MCH RBC QN AUTO: 28.7 PG (ref 26–33)
MCHC RBC AUTO-ENTMCNC: 30.2 GM/DL (ref 32.2–35.5)
MCV RBC AUTO: 94.9 FL (ref 80–94)
MONOCYTES ABSOLUTE: 0.9 THOU/MM3 (ref 0.4–1.3)
MONOCYTES NFR BLD AUTO: 8.8 %
MRSA SPEC QL CULT: NORMAL
NEUTROPHILS ABSOLUTE: 8.6 THOU/MM3 (ref 1.8–7.7)
NEUTROPHILS NFR BLD AUTO: 80.6 %
NRBC BLD AUTO-RTO: 1 /100 WBC
PCO2 BLDA: 74 MMHG (ref 35–45)
PCO2 BLDA: 78 MMHG (ref 35–45)
PH BLDA: 7.42 [PH] (ref 7.35–7.45)
PH BLDA: 7.44 [PH] (ref 7.35–7.45)
PLATELET # BLD AUTO: 184 THOU/MM3 (ref 130–400)
PMV BLD AUTO: 9.2 FL (ref 9.4–12.4)
PO2 BLDA: 68 MMHG (ref 71–104)
PO2 BLDA: 88 MMHG (ref 71–104)
POTASSIUM SERPL-SCNC: 3.7 MEQ/L (ref 3.5–5.2)
POTASSIUM SERPL-SCNC: 4.4 MEQ/L (ref 3.5–5.2)
PROT SERPL-MCNC: 5.9 G/DL (ref 6.4–8.3)
RBC # BLD AUTO: 5.86 MILL/MM3 (ref 4.7–6.1)
SAO2 % BLDA: 93 %
SAO2 % BLDA: 96 %
SODIUM SERPL-SCNC: 143 MEQ/L (ref 135–145)
SODIUM SERPL-SCNC: 143 MEQ/L (ref 135–145)
TSH SERPL DL<=0.05 MIU/L-ACNC: 3.02 UIU/ML (ref 0.27–4.2)
VENTILATION MODE VENT: ABNORMAL
VENTILATION MODE VENT: ABNORMAL
WBC # BLD AUTO: 10.7 THOU/MM3 (ref 4.8–10.8)

## 2025-05-10 PROCEDURE — 93975 VASCULAR STUDY: CPT

## 2025-05-10 PROCEDURE — 71045 X-RAY EXAM CHEST 1 VIEW: CPT

## 2025-05-10 PROCEDURE — 86664 EPSTEIN-BARR NUCLEAR ANTIGEN: CPT

## 2025-05-10 PROCEDURE — 86663 EPSTEIN-BARR ANTIBODY: CPT

## 2025-05-10 PROCEDURE — 2700000000 HC OXYGEN THERAPY PER DAY

## 2025-05-10 PROCEDURE — 85025 COMPLETE CBC W/AUTO DIFF WBC: CPT

## 2025-05-10 PROCEDURE — 82947 ASSAY GLUCOSE BLOOD QUANT: CPT

## 2025-05-10 PROCEDURE — 36600 WITHDRAWAL OF ARTERIAL BLOOD: CPT

## 2025-05-10 PROCEDURE — 2060000000 HC ICU INTERMEDIATE R&B

## 2025-05-10 PROCEDURE — 2500000003 HC RX 250 WO HCPCS

## 2025-05-10 PROCEDURE — 86665 EPSTEIN-BARR CAPSID VCA: CPT

## 2025-05-10 PROCEDURE — 84443 ASSAY THYROID STIM HORMONE: CPT

## 2025-05-10 PROCEDURE — 82803 BLOOD GASES ANY COMBINATION: CPT

## 2025-05-10 PROCEDURE — 94660 CPAP INITIATION&MGMT: CPT

## 2025-05-10 PROCEDURE — 6370000000 HC RX 637 (ALT 250 FOR IP): Performed by: INTERNAL MEDICINE

## 2025-05-10 PROCEDURE — 94003 VENT MGMT INPAT SUBQ DAY: CPT

## 2025-05-10 PROCEDURE — 6360000002 HC RX W HCPCS

## 2025-05-10 PROCEDURE — 99291 CRITICAL CARE FIRST HOUR: CPT | Performed by: INTERNAL MEDICINE

## 2025-05-10 PROCEDURE — 36415 COLL VENOUS BLD VENIPUNCTURE: CPT

## 2025-05-10 PROCEDURE — 94761 N-INVAS EAR/PLS OXIMETRY MLT: CPT

## 2025-05-10 PROCEDURE — 86645 CMV ANTIBODY IGM: CPT

## 2025-05-10 PROCEDURE — 76705 ECHO EXAM OF ABDOMEN: CPT

## 2025-05-10 PROCEDURE — 82248 BILIRUBIN DIRECT: CPT

## 2025-05-10 PROCEDURE — 80053 COMPREHEN METABOLIC PANEL: CPT

## 2025-05-10 RX ORDER — BUMETANIDE 0.25 MG/ML
2 INJECTION, SOLUTION INTRAMUSCULAR; INTRAVENOUS 2 TIMES DAILY
Status: DISCONTINUED | OUTPATIENT
Start: 2025-05-10 | End: 2025-05-10

## 2025-05-10 RX ORDER — BUMETANIDE 1 MG/1
2 TABLET ORAL 2 TIMES DAILY
Status: DISCONTINUED | OUTPATIENT
Start: 2025-05-10 | End: 2025-05-10

## 2025-05-10 RX ORDER — BUMETANIDE 0.25 MG/ML
1 INJECTION, SOLUTION INTRAMUSCULAR; INTRAVENOUS 2 TIMES DAILY
Status: DISCONTINUED | OUTPATIENT
Start: 2025-05-11 | End: 2025-05-15 | Stop reason: HOSPADM

## 2025-05-10 RX ADMIN — BUMETANIDE 2 MG: 0.25 INJECTION INTRAMUSCULAR; INTRAVENOUS at 17:35

## 2025-05-10 RX ADMIN — BUMETANIDE 2 MG: 0.25 INJECTION INTRAMUSCULAR; INTRAVENOUS at 12:33

## 2025-05-10 RX ADMIN — ENOXAPARIN SODIUM 60 MG: 100 INJECTION SUBCUTANEOUS at 20:23

## 2025-05-10 RX ADMIN — PANTOPRAZOLE SODIUM 40 MG: 40 INJECTION, POWDER, FOR SOLUTION INTRAVENOUS at 07:58

## 2025-05-10 RX ADMIN — SODIUM CHLORIDE, PRESERVATIVE FREE 10 ML: 5 INJECTION INTRAVENOUS at 21:00

## 2025-05-10 RX ADMIN — 0.12% CHLORHEXIDINE GLUCONATE 15 ML: 1.2 RINSE ORAL at 07:58

## 2025-05-10 RX ADMIN — ENOXAPARIN SODIUM 60 MG: 100 INJECTION SUBCUTANEOUS at 07:58

## 2025-05-10 RX ADMIN — SODIUM CHLORIDE, PRESERVATIVE FREE 10 ML: 5 INJECTION INTRAVENOUS at 07:58

## 2025-05-10 RX ADMIN — Medication 75 MCG/HR: at 00:19

## 2025-05-10 ASSESSMENT — PULMONARY FUNCTION TESTS
PIF_VALUE: 23
PIF_VALUE: 30
PIF_VALUE: 27

## 2025-05-10 NOTE — PLAN OF CARE
Problem: Safety - Adult  Goal: Free from fall injury  5/10/2025 0823 by Tj Andino RN  Outcome: Progressing  Flowsheets (Taken 5/10/2025 0823)  Free From Fall Injury:   Based on caregiver fall risk screen, instruct family/caregiver to ask for assistance with transferring infant if caregiver noted to have fall risk factors   Instruct family/caregiver on patient safety     Problem: Discharge Planning  Goal: Discharge to home or other facility with appropriate resources  5/10/2025 0823 by Tj Andino RN  Outcome: Progressing  Flowsheets (Taken 5/10/2025 0823)  Discharge to home or other facility with appropriate resources:   Refer to discharge planning if patient needs post-hospital services based on physician order or complex needs related to functional status, cognitive ability or social support system   Identify discharge learning needs (meds, wound care, etc)   Identify barriers to discharge with patient and caregiver   Arrange for needed discharge resources and transportation as appropriate     Problem: Pain  Goal: Verbalizes/displays adequate comfort level or baseline comfort level  5/10/2025 0823 by Tj Andino RN  Outcome: Progressing  Flowsheets (Taken 5/10/2025 0823)  Verbalizes/displays adequate comfort level or baseline comfort level:   Administer analgesics based on type and severity of pain and evaluate response   Consider cultural and social influences on pain and pain management   Encourage patient to monitor pain and request assistance   Assess pain using appropriate pain scale   Implement non-pharmacological measures as appropriate and evaluate response   Notify Licensed Independent Practitioner if interventions unsuccessful or patient reports new pain     Problem: Skin/Tissue Integrity  Goal: Skin integrity remains intact  Description: 1.  Monitor for areas of redness and/or skin breakdown2.  Assess vascular access sites hourly3.  Every 4-6 hours minimum:  Change oxygen saturation

## 2025-05-10 NOTE — PLAN OF CARE
Problem: Safety - Adult  Goal: Free from fall injury  Outcome: Progressing  Flowsheets (Taken 5/9/2025 2140)  Free From Fall Injury:   Instruct family/caregiver on patient safety   Based on caregiver fall risk screen, instruct family/caregiver to ask for assistance with transferring infant if caregiver noted to have fall risk factors     Problem: Discharge Planning  Goal: Discharge to home or other facility with appropriate resources  Outcome: Progressing  Flowsheets (Taken 5/9/2025 2140)  Discharge to home or other facility with appropriate resources:   Identify barriers to discharge with patient and caregiver   Identify discharge learning needs (meds, wound care, etc)     Problem: Pain  Goal: Verbalizes/displays adequate comfort level or baseline comfort level  Outcome: Progressing  Flowsheets (Taken 5/9/2025 2140)  Verbalizes/displays adequate comfort level or baseline comfort level:   Encourage patient to monitor pain and request assistance   Assess pain using appropriate pain scale   Administer analgesics based on type and severity of pain and evaluate response     Problem: Skin/Tissue Integrity  Goal: Skin integrity remains intact  Description: 1.  Monitor for areas of redness and/or skin breakdown2.  Assess vascular access sites hourly3.  Every 4-6 hours minimum:  Change oxygen saturation probe site4.  Every 4-6 hours:  If on nasal continuous positive airway pressure, respiratory therapy assess nares and determine need for appliance change or resting period  Outcome: Progressing  Flowsheets (Taken 5/9/2025 2140)  Skin Integrity Remains Intact:   Monitor for areas of redness and/or skin breakdown   Every 4-6 hours minimum:  Change oxygen saturation probe site     Problem: Safety - Medical Restraint  Goal: Remains free of injury from restraints (Restraint for Interference with Medical Device)  Description: INTERVENTIONS:1. Determine that other, less restrictive measures have been tried or would not be

## 2025-05-10 NOTE — SIGNIFICANT EVENT
Patient extubated on 05/10, notes does not smoke now but did smoke for a long while when he was younger. Will likely need pulmonary OP referral. Have him on albuterol nebs prn.   Diuresis with 2mg Bumex BID

## 2025-05-10 NOTE — PLAN OF CARE
Patient is a 54-year-old male past medical history of left limb edema and diabetes who presented with shortness of breath and fatigue.  Patient noted to be drowsy lately and falling asleep at the wheel.  Was initially referred to [facility] sleep clinic.  Eventually found himself in the ED.  Chest x-ray showed pulmonary edema.  Found to be hypercapnic requiring intubation 5/9/2025.  He was extubated today 5/10/2025.  ABG shows pH of 7.44, PCO2 of 74, , bicarb 50.  Patient is stable for transfer out of ICU.  Pulmonology consulted for BiPAP management and possibly qualifying him for BiPAP.  Low threshold to transfer back to ICU.  Hepatitis panel ordered.  Patient is stable for transfer out of ICU at this moment.

## 2025-05-11 PROBLEM — R16.0 HEPATOMEGALY: Status: ACTIVE | Noted: 2025-05-11

## 2025-05-11 PROBLEM — I50.33 HEART FAILURE, DIASTOLIC, WITH ACUTE DECOMPENSATION (HCC): Status: ACTIVE | Noted: 2025-05-11

## 2025-05-11 PROBLEM — I27.81 COR PULMONALE (HCC): Status: ACTIVE | Noted: 2025-05-11

## 2025-05-11 PROBLEM — R94.31 QT PROLONGATION: Status: ACTIVE | Noted: 2025-05-11

## 2025-05-11 PROBLEM — E66.813 CLASS 3 OBESITY (HCC): Status: ACTIVE | Noted: 2025-05-11

## 2025-05-11 LAB
ALBUMIN SERPL BCG-MCNC: 3.1 G/DL (ref 3.4–4.9)
ALP SERPL-CCNC: 82 U/L (ref 40–129)
ALT SERPL W/O P-5'-P-CCNC: 193 U/L (ref 10–50)
AMPHETAMINES UR QL SCN: NEGATIVE
ANION GAP SERPL CALC-SCNC: 12 MEQ/L (ref 8–16)
ANION GAP SERPL CALC-SCNC: 12 MEQ/L (ref 8–16)
ANION GAP SERPL CALC-SCNC: 14 MEQ/L (ref 8–16)
AST SERPL-CCNC: 89 U/L (ref 10–50)
BACTERIA SPEC RESP CULT: NORMAL
BARBITURATES UR QL SCN: NEGATIVE
BASE EXCESS BLDA CALC-SCNC: 17.6 MMOL/L (ref -2–3)
BASOPHILS ABSOLUTE: 0 THOU/MM3 (ref 0–0.1)
BASOPHILS NFR BLD AUTO: 0.2 %
BENZODIAZ UR QL SCN: NEGATIVE
BILIRUB SERPL-MCNC: 1.1 MG/DL (ref 0.3–1.2)
BUN SERPL-MCNC: 13 MG/DL (ref 8–23)
BUN SERPL-MCNC: 16 MG/DL (ref 8–23)
BUN SERPL-MCNC: 17 MG/DL (ref 8–23)
BZE UR QL SCN: NEGATIVE
CALCIUM SERPL-MCNC: 8.6 MG/DL (ref 8.6–10)
CALCIUM SERPL-MCNC: 8.7 MG/DL (ref 8.6–10)
CALCIUM SERPL-MCNC: 8.8 MG/DL (ref 8.6–10)
CANNABINOIDS UR QL SCN: NEGATIVE
CHLORIDE SERPL-SCNC: 90 MEQ/L (ref 98–111)
CO2 SERPL-SCNC: 35 MEQ/L (ref 22–29)
CO2 SERPL-SCNC: 40 MEQ/L (ref 22–29)
CO2 SERPL-SCNC: 42 MEQ/L (ref 22–29)
COLLECTED BY:: ABNORMAL
CREAT SERPL-MCNC: 0.9 MG/DL (ref 0.7–1.2)
DEPRECATED RDW RBC AUTO: 55.1 FL (ref 35–45)
DEVICE: ABNORMAL
EKG ATRIAL RATE: 76 BPM
EKG P AXIS: 48 DEGREES
EKG P-R INTERVAL: 172 MS
EKG Q-T INTERVAL: 402 MS
EKG QRS DURATION: 100 MS
EKG QTC CALCULATION (BAZETT): 452 MS
EKG R AXIS: -61 DEGREES
EKG T AXIS: 18 DEGREES
EKG VENTRICULAR RATE: 76 BPM
EOSINOPHIL NFR BLD AUTO: 0.3 %
EOSINOPHILS ABSOLUTE: 0 THOU/MM3 (ref 0–0.4)
ERYTHROCYTE [DISTWIDTH] IN BLOOD BY AUTOMATED COUNT: 16.3 % (ref 11.5–14.5)
FENTANYL: POSITIVE
GFR SERPL CREATININE-BSD FRML MDRD: > 90 ML/MIN/1.73M2
GLUCOSE SERPL-MCNC: 113 MG/DL (ref 74–109)
GLUCOSE SERPL-MCNC: 80 MG/DL (ref 74–109)
GLUCOSE SERPL-MCNC: 82 MG/DL (ref 74–109)
GRAM STN SPEC: NORMAL
HAV IGM SER QL: NONREACTIVE
HBV CORE IGM SERPL QL IA: NONREACTIVE
HBV SURFACE AG SERPL QL IA: NONREACTIVE
HCO3 BLDA-SCNC: 47 MMOL/L (ref 23–28)
HCT VFR BLD AUTO: 56 % (ref 42–52)
HCV IGG SERPL QL IA: NONREACTIVE
HGB BLD-MCNC: 17.1 GM/DL (ref 14–18)
IMM GRANULOCYTES # BLD AUTO: 0.04 THOU/MM3 (ref 0–0.07)
IMM GRANULOCYTES NFR BLD AUTO: 0.4 %
LYMPHOCYTES ABSOLUTE: 1 THOU/MM3 (ref 1–4.8)
LYMPHOCYTES NFR BLD AUTO: 10.7 %
MAGNESIUM SERPL-MCNC: 2.1 MG/DL (ref 1.6–2.6)
MAGNESIUM SERPL-MCNC: 2.1 MG/DL (ref 1.6–2.6)
MCH RBC QN AUTO: 28.9 PG (ref 26–33)
MCHC RBC AUTO-ENTMCNC: 30.5 GM/DL (ref 32.2–35.5)
MCV RBC AUTO: 94.6 FL (ref 80–94)
MONOCYTES ABSOLUTE: 1 THOU/MM3 (ref 0.4–1.3)
MONOCYTES NFR BLD AUTO: 10.5 %
NEUTROPHILS ABSOLUTE: 7.4 THOU/MM3 (ref 1.8–7.7)
NEUTROPHILS NFR BLD AUTO: 77.9 %
NRBC BLD AUTO-RTO: 0 /100 WBC
OPIATES UR QL SCN: NEGATIVE
OXYCODONE: NEGATIVE
PCO2 TEMP ADJ BLDMV: 62 MMHG (ref 41–51)
PCP UR QL SCN: NEGATIVE
PH BLDMV: 7.48 [PH] (ref 7.31–7.41)
PHOSPHATE SERPL-MCNC: 4.2 MG/DL (ref 2.5–4.5)
PLATELET # BLD AUTO: 171 THOU/MM3 (ref 130–400)
PMV BLD AUTO: 8.8 FL (ref 9.4–12.4)
PO2 BLDMV: 90 MMHG (ref 25–40)
POTASSIUM SERPL-SCNC: 3.7 MEQ/L (ref 3.5–5.2)
POTASSIUM SERPL-SCNC: 4.1 MEQ/L (ref 3.5–5.2)
POTASSIUM SERPL-SCNC: 4.1 MEQ/L (ref 3.5–5.2)
PROT SERPL-MCNC: 6.4 G/DL (ref 6.4–8.3)
RBC # BLD AUTO: 5.92 MILL/MM3 (ref 4.7–6.1)
SAO2 % BLDMV: 97 %
SITE: ABNORMAL
SODIUM SERPL-SCNC: 137 MEQ/L (ref 135–145)
SODIUM SERPL-SCNC: 144 MEQ/L (ref 135–145)
SODIUM SERPL-SCNC: 144 MEQ/L (ref 135–145)
VENTILATION MODE VENT: ABNORMAL
WBC # BLD AUTO: 9.5 THOU/MM3 (ref 4.8–10.8)

## 2025-05-11 PROCEDURE — 82803 BLOOD GASES ANY COMBINATION: CPT

## 2025-05-11 PROCEDURE — 6360000002 HC RX W HCPCS: Performed by: STUDENT IN AN ORGANIZED HEALTH CARE EDUCATION/TRAINING PROGRAM

## 2025-05-11 PROCEDURE — 36415 COLL VENOUS BLD VENIPUNCTURE: CPT

## 2025-05-11 PROCEDURE — 99233 SBSQ HOSP IP/OBS HIGH 50: CPT | Performed by: INTERNAL MEDICINE

## 2025-05-11 PROCEDURE — 85025 COMPLETE CBC W/AUTO DIFF WBC: CPT

## 2025-05-11 PROCEDURE — 80074 ACUTE HEPATITIS PANEL: CPT

## 2025-05-11 PROCEDURE — 80307 DRUG TEST PRSMV CHEM ANLYZR: CPT

## 2025-05-11 PROCEDURE — 80053 COMPREHEN METABOLIC PANEL: CPT

## 2025-05-11 PROCEDURE — 93005 ELECTROCARDIOGRAM TRACING: CPT | Performed by: INTERNAL MEDICINE

## 2025-05-11 PROCEDURE — 2500000003 HC RX 250 WO HCPCS

## 2025-05-11 PROCEDURE — 6370000000 HC RX 637 (ALT 250 FOR IP): Performed by: STUDENT IN AN ORGANIZED HEALTH CARE EDUCATION/TRAINING PROGRAM

## 2025-05-11 PROCEDURE — 94761 N-INVAS EAR/PLS OXIMETRY MLT: CPT

## 2025-05-11 PROCEDURE — 83735 ASSAY OF MAGNESIUM: CPT

## 2025-05-11 PROCEDURE — 84100 ASSAY OF PHOSPHORUS: CPT

## 2025-05-11 PROCEDURE — 2060000000 HC ICU INTERMEDIATE R&B

## 2025-05-11 PROCEDURE — 94660 CPAP INITIATION&MGMT: CPT

## 2025-05-11 PROCEDURE — 5A09457 ASSISTANCE WITH RESPIRATORY VENTILATION, 24-96 CONSECUTIVE HOURS, CONTINUOUS POSITIVE AIRWAY PRESSURE: ICD-10-PCS | Performed by: INTERNAL MEDICINE

## 2025-05-11 PROCEDURE — 6360000002 HC RX W HCPCS

## 2025-05-11 PROCEDURE — 99254 IP/OBS CNSLTJ NEW/EST MOD 60: CPT

## 2025-05-11 PROCEDURE — 2700000000 HC OXYGEN THERAPY PER DAY

## 2025-05-11 PROCEDURE — 93010 ELECTROCARDIOGRAM REPORT: CPT | Performed by: INTERNAL MEDICINE

## 2025-05-11 RX ORDER — ACETAZOLAMIDE 250 MG/1
250 TABLET ORAL ONCE
Status: COMPLETED | OUTPATIENT
Start: 2025-05-11 | End: 2025-05-11

## 2025-05-11 RX ADMIN — ACETAZOLAMIDE 250 MG: 250 TABLET ORAL at 03:39

## 2025-05-11 RX ADMIN — SODIUM CHLORIDE, PRESERVATIVE FREE 10 ML: 5 INJECTION INTRAVENOUS at 21:46

## 2025-05-11 RX ADMIN — ENOXAPARIN SODIUM 60 MG: 100 INJECTION SUBCUTANEOUS at 21:45

## 2025-05-11 RX ADMIN — PANTOPRAZOLE SODIUM 40 MG: 40 INJECTION, POWDER, FOR SOLUTION INTRAVENOUS at 08:58

## 2025-05-11 RX ADMIN — SODIUM CHLORIDE, PRESERVATIVE FREE 10 ML: 5 INJECTION INTRAVENOUS at 08:58

## 2025-05-11 RX ADMIN — BUMETANIDE 1 MG: 0.25 INJECTION INTRAMUSCULAR; INTRAVENOUS at 18:13

## 2025-05-11 RX ADMIN — ENOXAPARIN SODIUM 60 MG: 100 INJECTION SUBCUTANEOUS at 08:58

## 2025-05-11 NOTE — PLAN OF CARE
Problem: Safety - Adult  Goal: Free from fall injury  Outcome: Progressing  Flowsheets (Taken 5/11/2025 0408)  Free From Fall Injury: Instruct family/caregiver on patient safety     Problem: Discharge Planning  Goal: Discharge to home or other facility with appropriate resources  Outcome: Progressing  Flowsheets (Taken 5/11/2025 0408)  Discharge to home or other facility with appropriate resources:   Identify barriers to discharge with patient and caregiver   Arrange for needed discharge resources and transportation as appropriate   Identify discharge learning needs (meds, wound care, etc)   Refer to discharge planning if patient needs post-hospital services based on physician order or complex needs related to functional status, cognitive ability or social support system     Problem: Pain  Goal: Verbalizes/displays adequate comfort level or baseline comfort level  Outcome: Progressing  Flowsheets (Taken 5/11/2025 0408)  Verbalizes/displays adequate comfort level or baseline comfort level:   Encourage patient to monitor pain and request assistance   Assess pain using appropriate pain scale   Administer analgesics based on type and severity of pain and evaluate response   Implement non-pharmacological measures as appropriate and evaluate response   Notify Licensed Independent Practitioner if interventions unsuccessful or patient reports new pain     Problem: Skin/Tissue Integrity  Goal: Skin integrity remains intact  Description: 1.  Monitor for areas of redness and/or skin breakdown2.  Assess vascular access sites hourly3.  Every 4-6 hours minimum:  Change oxygen saturation probe site4.  Every 4-6 hours:  If on nasal continuous positive airway pressure, respiratory therapy assess nares and determine need for appliance change or resting period  Outcome: Progressing  Flowsheets (Taken 5/11/2025 0408)  Skin Integrity Remains Intact:   Monitor for areas of redness and/or skin breakdown   Assess vascular access sites

## 2025-05-11 NOTE — PLAN OF CARE
Problem: Respiratory - Adult  Goal: Achieves optimal ventilation and oxygenation  5/11/2025 0427 by Nina Olson RCP  Outcome: Progressing   Patient mutually agrees upon goal.

## 2025-05-11 NOTE — PROCEDURES
PROCEDURE NOTE  Date: 5/11/2025   Name: Nilson Hanson  YOB: 1970    Procedures  12 lead EKG completed. Results handed to Rulsan Bill RN.

## 2025-05-11 NOTE — CONSULTS
[facility]    Patient - Nilson Hanson   MRN -  [id number]   [facility] # - [id number]   - 1970      Date of Admission -  2025  9:44 AM  Date of evaluation -  2025  Room - [facility]15/015-A   Hospital Day - 2  Consulting - Marck Bronson DO Primary Care Physician - Lai Irvin MD     Problem List      Active Hospital Problems    Diagnosis Date Noted    Acute respiratory failure with hypoxia and hypercapnia (HCC) [J96.01, J96.02] 2025     Reason for Consult    BiPAP management  HPI   History Obtained From: Patient and electronic medical record.    Nilson Hanson is a 54 y.o. male  was initially admitted under ICU service. His has past medical history of chronic left limb edema and DM type 2. [facility] with shortness of breath and fatigue. In the ED, due to peripheral edema and chronic venous stasis there was concern for PE. CTA chest completed and negative fro PE. Chest x-ray revealed pulmonary edema. VBG completed with pH 7.11 and CO2 131. Patient was intubated on 25 for airway protection and was admitted to ICU. Patient was extubated to BiPAP 5/10/25 and transferred to stepdown ICU. Pulmonary medicine was consulted for further management of respiratory distress/ BiPAP management.    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 Patient was ordered to do a baseline PSG in  by his primary care provider. Does not appear this was completed. He had appointment with Dr. Shaw in 2022 for sleep evaluation however appears patient did not attend appointment as it was marked no show. Patient admits today that he overslept for appointment and never rescheduled. New referral placed by primary care provider 25 to the sleep clinic for hypersomnolence.       At the time of my initial evaluation, he remains on BiPAP.   He is currently on treatment with a BiPAP with following

## 2025-05-12 PROBLEM — I50.43 CHF (CONGESTIVE HEART FAILURE), NYHA CLASS I, ACUTE ON CHRONIC, COMBINED (HCC): Status: ACTIVE | Noted: 2025-05-11

## 2025-05-12 PROBLEM — R79.89 ELEVATED TROPONIN: Status: ACTIVE | Noted: 2025-05-12

## 2025-05-12 LAB
ALBUMIN SERPL BCG-MCNC: 3 G/DL (ref 3.4–4.9)
ALP SERPL-CCNC: 80 U/L (ref 40–129)
ALT SERPL W/O P-5'-P-CCNC: 198 U/L (ref 10–50)
ANION GAP SERPL CALC-SCNC: 10 MEQ/L (ref 8–16)
ARTERIAL PATENCY WRIST A: POSITIVE
AST SERPL-CCNC: 64 U/L (ref 10–50)
BASE EXCESS BLDA CALC-SCNC: 12 MMOL/L (ref -2.5–2.5)
BASOPHILS ABSOLUTE: 0 THOU/MM3 (ref 0–0.1)
BASOPHILS NFR BLD AUTO: 0.3 %
BDY SITE: ABNORMAL
BILIRUB SERPL-MCNC: 1.3 MG/DL (ref 0.3–1.2)
BUN SERPL-MCNC: 13 MG/DL (ref 8–23)
CALCIUM SERPL-MCNC: 8.7 MG/DL (ref 8.6–10)
CHLORIDE SERPL-SCNC: 91 MEQ/L (ref 98–111)
CO2 SERPL-SCNC: 36 MEQ/L (ref 22–29)
COLLECTED BY:: ABNORMAL
CREAT SERPL-MCNC: 0.8 MG/DL (ref 0.7–1.2)
DEPRECATED RDW RBC AUTO: 53.6 FL (ref 35–45)
DEVICE: ABNORMAL
EOSINOPHIL NFR BLD AUTO: 1.2 %
EOSINOPHILS ABSOLUTE: 0.1 THOU/MM3 (ref 0–0.4)
ERYTHROCYTE [DISTWIDTH] IN BLOOD BY AUTOMATED COUNT: 15.6 % (ref 11.5–14.5)
FIO2 ON VENT O2 ANALYZER: 5 %
GFR SERPL CREATININE-BSD FRML MDRD: > 90 ML/MIN/1.73M2
GLUCOSE SERPL-MCNC: 91 MG/DL (ref 74–109)
HCO3 BLDA-SCNC: 44 MMOL/L (ref 23–28)
HCT VFR BLD AUTO: 56.5 % (ref 42–52)
HGB BLD-MCNC: 16.9 GM/DL (ref 14–18)
IMM GRANULOCYTES # BLD AUTO: 0.03 THOU/MM3 (ref 0–0.07)
IMM GRANULOCYTES NFR BLD AUTO: 0.4 %
LYMPHOCYTES ABSOLUTE: 1 THOU/MM3 (ref 1–4.8)
LYMPHOCYTES NFR BLD AUTO: 12.2 %
MCH RBC QN AUTO: 28.5 PG (ref 26–33)
MCHC RBC AUTO-ENTMCNC: 29.9 GM/DL (ref 32.2–35.5)
MCV RBC AUTO: 95.1 FL (ref 80–94)
MONOCYTES ABSOLUTE: 0.9 THOU/MM3 (ref 0.4–1.3)
MONOCYTES NFR BLD AUTO: 11.2 %
NEUTROPHILS ABSOLUTE: 5.8 THOU/MM3 (ref 1.8–7.7)
NEUTROPHILS NFR BLD AUTO: 74.7 %
NRBC BLD AUTO-RTO: 0 /100 WBC
PCO2 BLDA: 78 MMHG (ref 35–45)
PH BLDA: 7.36 [PH] (ref 7.35–7.45)
PLATELET # BLD AUTO: 164 THOU/MM3 (ref 130–400)
PMV BLD AUTO: 9 FL (ref 9.4–12.4)
PO2 BLDA: 91 MMHG (ref 71–104)
POTASSIUM SERPL-SCNC: 3.9 MEQ/L (ref 3.5–5.2)
PROT SERPL-MCNC: 6.6 G/DL (ref 6.4–8.3)
RBC # BLD AUTO: 5.94 MILL/MM3 (ref 4.7–6.1)
SAO2 % BLDA: 96 %
SODIUM SERPL-SCNC: 137 MEQ/L (ref 135–145)
VENTILATION MODE VENT: ABNORMAL
WBC # BLD AUTO: 7.8 THOU/MM3 (ref 4.8–10.8)

## 2025-05-12 PROCEDURE — 94660 CPAP INITIATION&MGMT: CPT

## 2025-05-12 PROCEDURE — 94010 BREATHING CAPACITY TEST: CPT

## 2025-05-12 PROCEDURE — 82803 BLOOD GASES ANY COMBINATION: CPT

## 2025-05-12 PROCEDURE — 2500000003 HC RX 250 WO HCPCS

## 2025-05-12 PROCEDURE — 94761 N-INVAS EAR/PLS OXIMETRY MLT: CPT

## 2025-05-12 PROCEDURE — 85025 COMPLETE CBC W/AUTO DIFF WBC: CPT

## 2025-05-12 PROCEDURE — 36415 COLL VENOUS BLD VENIPUNCTURE: CPT

## 2025-05-12 PROCEDURE — 6360000002 HC RX W HCPCS

## 2025-05-12 PROCEDURE — 2060000000 HC ICU INTERMEDIATE R&B

## 2025-05-12 PROCEDURE — 2700000000 HC OXYGEN THERAPY PER DAY

## 2025-05-12 PROCEDURE — 80053 COMPREHEN METABOLIC PANEL: CPT

## 2025-05-12 PROCEDURE — 2580000003 HC RX 258

## 2025-05-12 PROCEDURE — 36600 WITHDRAWAL OF ARTERIAL BLOOD: CPT

## 2025-05-12 PROCEDURE — 99233 SBSQ HOSP IP/OBS HIGH 50: CPT | Performed by: INTERNAL MEDICINE

## 2025-05-12 PROCEDURE — 6360000002 HC RX W HCPCS: Performed by: STUDENT IN AN ORGANIZED HEALTH CARE EDUCATION/TRAINING PROGRAM

## 2025-05-12 PROCEDURE — 99255 IP/OBS CONSLTJ NEW/EST HI 80: CPT | Performed by: INTERNAL MEDICINE

## 2025-05-12 RX ADMIN — BUMETANIDE 1 MG: 0.25 INJECTION INTRAMUSCULAR; INTRAVENOUS at 08:42

## 2025-05-12 RX ADMIN — SODIUM CHLORIDE, PRESERVATIVE FREE 10 ML: 5 INJECTION INTRAVENOUS at 20:36

## 2025-05-12 RX ADMIN — ENOXAPARIN SODIUM 60 MG: 100 INJECTION SUBCUTANEOUS at 08:41

## 2025-05-12 RX ADMIN — BUMETANIDE 1 MG: 0.25 INJECTION INTRAMUSCULAR; INTRAVENOUS at 17:50

## 2025-05-12 RX ADMIN — PANTOPRAZOLE SODIUM 40 MG: 40 INJECTION, POWDER, FOR SOLUTION INTRAVENOUS at 08:42

## 2025-05-12 RX ADMIN — ACETAZOLAMIDE SODIUM 500 MG: 500 INJECTION, POWDER, LYOPHILIZED, FOR SOLUTION INTRAVENOUS at 15:32

## 2025-05-12 RX ADMIN — SODIUM CHLORIDE, PRESERVATIVE FREE 10 ML: 5 INJECTION INTRAVENOUS at 08:41

## 2025-05-12 RX ADMIN — ENOXAPARIN SODIUM 60 MG: 100 INJECTION SUBCUTANEOUS at 20:36

## 2025-05-12 NOTE — H&P
[facility]  Sedation/Analgesia History & Physical    Pt Name: Nilson Hanson  Account number: [id number]  MRN: [id number]  YOB: 1970  Provider Performing Procedure: Macario Valladares MD MD  Referring Provider: Marck Bronson DO   Primary Care Physician: Lai Irvin MD  Date: 5/12/2025    PRE-PROCEDURE    Code Status: FULL CODE  Brief History/Pre-Procedure Diagnosis:   CHF  Right heart failure    Consent: : I have discussed with the patient risks, benefits, and alternatives (and relevant risks, benefits, and side effects related to alternatives or not receiving care), and likelihood of the success.   The patient and/or representative appear to understand and agree to proceed.  The discussion encompasses risks, benefits, and side effects related to the alternatives and the risks related to not receiving the proposed care, treatment, and services.     The indication, risks and benefits of the procedure and possible therapeutic consequences and alternatives were discussed with the patient. The patient was given the opportunity to ask questions and to have them answered to his/her satisfaction. Risks of the procedure include but are not limited to the following: Bleeding, hematoma including retroperitoneal hemmorhage, infection, pain and discomfort, injury to the aorta and other blood vessels, rhythm disturbance, low blood pressure, myocardial infarction, stroke, kidney damage/failure, myocardial perforation, allergic reactions to sedatives/contrast material, loss of pulse/vascular compromise requiring surgery, aneurysm/pseudoaneurysm formation, possible loss of a limb/hand/leg, needing blood transfusion, requiring emergent open heart surgery or emergent coronary intervention, the need for intubation/respiratory support, the requirement for defibrillation/cardioversion, and death. Alternatives to and omission of the suggested procedure were discussed. The patient had no further

## 2025-05-12 NOTE — CARE COORDINATION
Case Management Assessment Initial Evaluation    Date/Time of Evaluation: 5/12/2025 11:53 AM  Assessment Completed by: Eduardo Cortes RN    If patient is discharged prior to next notation, then this note serves as note for discharge by case management.    Patient Name: Nilson Hanson                   YOB: 1970  Diagnosis: Shortness of breath [R06.02]  Elevated troponin [R79.89]  SIRS (systemic inflammatory response syndrome) (HCC) [R65.10]  Acute respiratory failure with hypoxia and hypercapnia (HCC) [J96.01, J96.02]  Pneumonia of right lower lobe due to infectious organism [J18.9]                   Date / Time: 5/9/2025  9:44 AM  Location: [address] [place] [address]     Patient Admission Status: Inpatient   Readmission Risk Low 0-14, Mod 15-19), High > 20: Readmission Risk Score: 11.4    Current PCP: Lai Irvin MD  Health Care Decision Makers:   Primary Decision Maker: Laura Hernandez - [facility] - [phone]    Additional Case Management Notes:   From [facility] to ICU (ventilator there)  PNA/CHF  PMH: DM, Pulmonary HTN, Ascites, CHF  ABG PH 7.29, CO2 90; monitor  BIPAP 40% FIO2 v Oxygen 6L  IV Diuresing  IV PPI  Await 5/14 0830 BIPAP Training w SO/POA  Await Cardiac Cath  Procedures:  5/14 Cardiac Cath  Patient Goals/Plan/Treatment Preferences: plans home alone, has SO/POA Laura still drives; new  HME BIPAP, new CHF Clinic           05/12/25 7538   Service Assessment   Patient Orientation Alert and Oriented   Cognition Alert   History Provided By Patient;Medical Record   Primary Caregiver Self   Accompanied By/Relationship none   Support Systems Spouse/Significant Other   Patient's Healthcare Decision Maker is: Named in Scanned ACP Document   PCP Verified by CM Yes   Last Visit to PCP Within last 3 months   Prior Functional Level Independent in ADLs/IADLs   Current Functional Level Independent in ADLs/IADLs   Can patient return to prior living arrangement Yes   Ability to make needs known: Good   Family

## 2025-05-12 NOTE — CONSULTS
[facility]'s  Consult    Patient's Name/Date of Birth: Nilson Hanson / 1970 (54 y.o.)    Date: May 12, 2025     Referring Provider: Marck Bronson DO    CHIEF COMPLAINT: Shortness of breath      HPI: This is a pleasant 54 y.o. male PMH notable for ELY, type 2 diabetes, obesity presents with shortness of breath and fatigue.  Patient presented with a complaint of difficulty staying awake while driving a .  Patient was scheduled for outpatient sleep evaluation.  Arrival to the ED patient was found to be hypoxic and hypercapnic.  Per record he failed BiPAP secondary to lethargy and required intubation.  Patient was extubated on 5/10/2025 to BiPAP.  BiPAP was able to be weaned to night and daytime naps.  Echo evaluation demonstrated EF 50 to 55% with severe right ventricle systolic dysfunction and RVSP of 41 mmHg.  CTA chest was negative for PE but did demonstrate backflow of contrast into the right ventricle and IVC.  Patient also noted to have ascites on imaging however not enough to drain and evaluate.  Cardiology was consulted for possible right heart cath in the setting of pulmonary hypertension.      Echo: Echo 5/9/2025 demonstrates EF 50 to 55%.  No wall motion abnormalities.  Right ventricle noted to be severely dilated with severely reduced systolic function.  RVSP 41 mmHg.  Also noting trivial pericardial effusion present.  Also noting left-sided pleural effusion.  Estimated right atrial pressure is elevated at 15 mmHg.       All labs, EKG's, diagnostic testing and images as well as cardiac cath, stress testing were reviewed during this encounter    History reviewed. No pertinent past medical history.  History reviewed. No pertinent surgical history.  Current Facility-Administered Medications   Medication Dose Route Frequency Provider Last Rate Last Admin    acetaZOLAMIDE (DIAMOX) 500 mg in sodium chloride 0.9 % 100 mL IVPB  500 mg IntraVENous Once

## 2025-05-13 PROBLEM — R07.9 CHEST PAIN: Status: ACTIVE | Noted: 2025-05-09

## 2025-05-13 LAB
ALBUMIN SERPL BCG-MCNC: 2.3 G/DL (ref 3.4–4.9)
ALP SERPL-CCNC: 81 U/L (ref 40–129)
ALT SERPL W/O P-5'-P-CCNC: 172 U/L (ref 10–50)
ANION GAP SERPL CALC-SCNC: 10 MEQ/L (ref 8–16)
ANION GAP SERPL CALC-SCNC: 16 MEQ/L (ref 8–16)
ARTERIAL PATENCY WRIST A: POSITIVE
ARTERIAL PATENCY WRIST A: POSITIVE
AST SERPL-CCNC: 42 U/L (ref 10–50)
BASE EXCESS BLDA CALC-SCNC: 9.1 MMOL/L (ref -2.5–2.5)
BASE EXCESS BLDA CALC-SCNC: 9.3 MMOL/L (ref -2.5–2.5)
BASOPHILS ABSOLUTE: 0 THOU/MM3 (ref 0–0.1)
BASOPHILS NFR BLD AUTO: 0.3 %
BDY SITE: ABNORMAL
BDY SITE: ABNORMAL
BILIRUB SERPL-MCNC: 0.8 MG/DL (ref 0.3–1.2)
BUN SERPL-MCNC: 14 MG/DL (ref 8–23)
BUN SERPL-MCNC: 15 MG/DL (ref 8–23)
CALCIUM SERPL-MCNC: 8.8 MG/DL (ref 8.6–10)
CALCIUM SERPL-MCNC: 9.1 MG/DL (ref 8.6–10)
CHLORIDE SERPL-SCNC: 94 MEQ/L (ref 98–111)
CHLORIDE SERPL-SCNC: 94 MEQ/L (ref 98–111)
CMV IGM SERPL-ACNC: < 8 AU/ML
CO2 SERPL-SCNC: 25 MEQ/L (ref 22–29)
CO2 SERPL-SCNC: 35 MEQ/L (ref 22–29)
COLLECTED BY:: ABNORMAL
COLLECTED BY:: ABNORMAL
CREAT SERPL-MCNC: 0.8 MG/DL (ref 0.7–1.2)
CREAT SERPL-MCNC: 0.8 MG/DL (ref 0.7–1.2)
DEPRECATED RDW RBC AUTO: 53.9 FL (ref 35–45)
DEVICE: ABNORMAL
DEVICE: ABNORMAL
EOSINOPHIL NFR BLD AUTO: 1.9 %
EOSINOPHILS ABSOLUTE: 0.2 THOU/MM3 (ref 0–0.4)
EPSTEIN-BARR VIRUS ANTIBODIES: NORMAL
ERYTHROCYTE [DISTWIDTH] IN BLOOD BY AUTOMATED COUNT: 15.8 % (ref 11.5–14.5)
FIO2 ON VENT O2 ANALYZER: 40 %
FIO2 ON VENT O2 ANALYZER: 5 %
GFR SERPL CREATININE-BSD FRML MDRD: > 90 ML/MIN/1.73M2
GFR SERPL CREATININE-BSD FRML MDRD: > 90 ML/MIN/1.73M2
GLUCOSE SERPL-MCNC: 108 MG/DL (ref 74–109)
GLUCOSE SERPL-MCNC: 133 MG/DL (ref 74–109)
HCO3 BLDA-SCNC: 39 MMOL/L (ref 23–28)
HCO3 BLDA-SCNC: 43 MMOL/L (ref 23–28)
HCT VFR BLD AUTO: 57.2 % (ref 42–52)
HGB BLD-MCNC: 17.1 GM/DL (ref 14–18)
IMM GRANULOCYTES # BLD AUTO: 0.03 THOU/MM3 (ref 0–0.07)
IMM GRANULOCYTES NFR BLD AUTO: 0.4 %
INR PPP: 1.16 (ref 0.85–1.13)
LYMPHOCYTES ABSOLUTE: 0.8 THOU/MM3 (ref 1–4.8)
LYMPHOCYTES NFR BLD AUTO: 10.1 %
MAGNESIUM SERPL-MCNC: 2.4 MG/DL (ref 1.6–2.6)
MAGNESIUM SERPL-MCNC: 2.5 MG/DL (ref 1.6–2.6)
MCH RBC QN AUTO: 28.6 PG (ref 26–33)
MCHC RBC AUTO-ENTMCNC: 29.9 GM/DL (ref 32.2–35.5)
MCV RBC AUTO: 95.7 FL (ref 80–94)
MONOCYTES ABSOLUTE: 0.9 THOU/MM3 (ref 0.4–1.3)
MONOCYTES NFR BLD AUTO: 10.8 %
NEUTROPHILS ABSOLUTE: 6 THOU/MM3 (ref 1.8–7.7)
NEUTROPHILS NFR BLD AUTO: 76.5 %
NRBC BLD AUTO-RTO: 0 /100 WBC
PCO2 BLDA: 68 MMHG (ref 35–45)
PCO2 BLDA: 90 MMHG (ref 35–45)
PEEP SETTING VENT: 8 MMHG
PH BLDA: 7.29 [PH] (ref 7.35–7.45)
PH BLDA: 7.37 [PH] (ref 7.35–7.45)
PLATELET # BLD AUTO: 154 THOU/MM3 (ref 130–400)
PMV BLD AUTO: 8.9 FL (ref 9.4–12.4)
PO2 BLDA: 92 MMHG (ref 71–104)
PO2 BLDA: 92 MMHG (ref 71–104)
POTASSIUM SERPL-SCNC: 3.9 MEQ/L (ref 3.5–5.2)
POTASSIUM SERPL-SCNC: 4.5 MEQ/L (ref 3.5–5.2)
PRESSURE SUPPORT SETTING VENT: 20 CMH2O
PROT SERPL-MCNC: 7 G/DL (ref 6.4–8.3)
RBC # BLD AUTO: 5.98 MILL/MM3 (ref 4.7–6.1)
SAO2 % BLDA: 95 %
SAO2 % BLDA: 96 %
SODIUM SERPL-SCNC: 135 MEQ/L (ref 135–145)
SODIUM SERPL-SCNC: 139 MEQ/L (ref 135–145)
VENTILATION MODE VENT: ABNORMAL
VENTILATION MODE VENT: ABNORMAL
WBC # BLD AUTO: 7.9 THOU/MM3 (ref 4.8–10.8)

## 2025-05-13 PROCEDURE — 6360000002 HC RX W HCPCS

## 2025-05-13 PROCEDURE — 99233 SBSQ HOSP IP/OBS HIGH 50: CPT | Performed by: INTERNAL MEDICINE

## 2025-05-13 PROCEDURE — 2580000003 HC RX 258

## 2025-05-13 PROCEDURE — 80053 COMPREHEN METABOLIC PANEL: CPT

## 2025-05-13 PROCEDURE — 94660 CPAP INITIATION&MGMT: CPT

## 2025-05-13 PROCEDURE — 36600 WITHDRAWAL OF ARTERIAL BLOOD: CPT

## 2025-05-13 PROCEDURE — 85025 COMPLETE CBC W/AUTO DIFF WBC: CPT

## 2025-05-13 PROCEDURE — 36415 COLL VENOUS BLD VENIPUNCTURE: CPT

## 2025-05-13 PROCEDURE — 6370000000 HC RX 637 (ALT 250 FOR IP): Performed by: INTERNAL MEDICINE

## 2025-05-13 PROCEDURE — 6360000002 HC RX W HCPCS: Performed by: STUDENT IN AN ORGANIZED HEALTH CARE EDUCATION/TRAINING PROGRAM

## 2025-05-13 PROCEDURE — 99232 SBSQ HOSP IP/OBS MODERATE 35: CPT | Performed by: PHYSICIAN ASSISTANT

## 2025-05-13 PROCEDURE — 85610 PROTHROMBIN TIME: CPT

## 2025-05-13 PROCEDURE — 94761 N-INVAS EAR/PLS OXIMETRY MLT: CPT

## 2025-05-13 PROCEDURE — 82803 BLOOD GASES ANY COMBINATION: CPT

## 2025-05-13 PROCEDURE — 83735 ASSAY OF MAGNESIUM: CPT

## 2025-05-13 PROCEDURE — 2700000000 HC OXYGEN THERAPY PER DAY

## 2025-05-13 PROCEDURE — 2060000000 HC ICU INTERMEDIATE R&B

## 2025-05-13 PROCEDURE — 2500000003 HC RX 250 WO HCPCS

## 2025-05-13 RX ORDER — ASPIRIN 81 MG/1
81 TABLET, CHEWABLE ORAL ONCE
Status: COMPLETED | OUTPATIENT
Start: 2025-05-13 | End: 2025-05-13

## 2025-05-13 RX ADMIN — ACETAZOLAMIDE SODIUM 500 MG: 500 INJECTION, POWDER, LYOPHILIZED, FOR SOLUTION INTRAVENOUS at 10:05

## 2025-05-13 RX ADMIN — ASPIRIN 81 MG: 81 TABLET, CHEWABLE ORAL at 06:22

## 2025-05-13 RX ADMIN — BUMETANIDE 1 MG: 0.25 INJECTION INTRAMUSCULAR; INTRAVENOUS at 09:57

## 2025-05-13 RX ADMIN — ENOXAPARIN SODIUM 60 MG: 100 INJECTION SUBCUTANEOUS at 20:33

## 2025-05-13 RX ADMIN — SODIUM CHLORIDE, PRESERVATIVE FREE 10 ML: 5 INJECTION INTRAVENOUS at 20:33

## 2025-05-13 RX ADMIN — PANTOPRAZOLE SODIUM 40 MG: 40 INJECTION, POWDER, FOR SOLUTION INTRAVENOUS at 09:57

## 2025-05-13 RX ADMIN — ENOXAPARIN SODIUM 60 MG: 100 INJECTION SUBCUTANEOUS at 09:57

## 2025-05-13 RX ADMIN — SODIUM CHLORIDE, PRESERVATIVE FREE 10 ML: 5 INJECTION INTRAVENOUS at 09:57

## 2025-05-13 RX ADMIN — BUMETANIDE 1 MG: 0.25 INJECTION INTRAMUSCULAR; INTRAVENOUS at 18:31

## 2025-05-13 NOTE — PLAN OF CARE
Problem: Safety - Adult  Goal: Free from fall injury  5/13/2025 0043 by Otis Simeon RN  Outcome: Progressing  Flowsheets (Taken 5/13/2025 0043)  Free From Fall Injury: Instruct family/caregiver on patient safety     Problem: Discharge Planning  Goal: Discharge to home or other facility with appropriate resources  5/13/2025 0043 by Otis Simeon, RN  Outcome: Progressing  Flowsheets (Taken 5/13/2025 0043)  Discharge to home or other facility with appropriate resources:   Identify barriers to discharge with patient and caregiver   Identify discharge learning needs (meds, wound care, etc)   Refer to discharge planning if patient needs post-hospital services based on physician order or complex needs related to functional status, cognitive ability or social support system   Arrange for needed discharge resources and transportation as appropriate     Problem: Pain  Goal: Verbalizes/displays adequate comfort level or baseline comfort level  5/13/2025 0043 by Otis Simeon RN  Outcome: Progressing  Flowsheets (Taken 5/13/2025 0043)  Verbalizes/displays adequate comfort level or baseline comfort level:   Encourage patient to monitor pain and request assistance   Administer analgesics based on type and severity of pain and evaluate response   Consider cultural and social influences on pain and pain management   Assess pain using appropriate pain scale   Implement non-pharmacological measures as appropriate and evaluate response   Notify Licensed Independent Practitioner if interventions unsuccessful or patient reports new pain     Problem: Skin/Tissue Integrity  Goal: Skin integrity remains intact  Description: 1.  Monitor for areas of redness and/or skin breakdown2.  Assess vascular access sites hourly3.  Every 4-6 hours minimum:  Change oxygen saturation probe site4.  Every 4-6 hours:  If on nasal continuous positive airway pressure, respiratory therapy assess nares and determine need for appliance change or

## 2025-05-13 NOTE — CARE COORDINATION
Client prefers new [facility] BIPAP after choices offered; collaborated w patient, Susan [phone] [facility] RCP, 5/15 BIPAP training planned; collaborated w Pulmonary    Update  BIPAP training rescheduled for 5/15 10 am r/t conflict w cardiac cath timing  Electronically signed by Eduardo Cortes RN on 5/14/2025 at 2:14 PM      Post-acute (PAC) provider list was provided to patient. Patient was informed of their freedom to choose PAC provider. Discussed and offered to show the patient the relevant PAC Providers quality and resource use measures on Medicare Compare web site via computer based on patient's goals of care and treatment preferences. Questions regarding selection process were answered.     05/13/25 1113   Discharge Planning   DME Ordered? Bipap   Services At/After Discharge   Transition of Care Consult (CM Consult) Discharge Planning   Services At/After Discharge DME   Condition of Participation: Discharge Planning   The Patient and/or Patient Representative was provided with a Choice of Provider? Patient   The Patient and/Or Patient Representative agree with the Discharge Plan? Yes   Freedom of Choice list was provided with basic dialogue that supports the patient's individualized plan of care/goals, treatment preferences, and shares the quality data associated with the providers?  Yes

## 2025-05-14 LAB
ABO GROUP BLD: NORMAL
ANION GAP SERPL CALC-SCNC: 11 MEQ/L (ref 8–16)
ANION GAP SERPL CALC-SCNC: 9 MEQ/L (ref 8–16)
APTT PPP: 33.5 SECONDS (ref 22–38)
BACTERIA BLD AEROBE CULT: NORMAL
BACTERIA BLD AEROBE CULT: NORMAL
BASOPHILS ABSOLUTE: 0 THOU/MM3 (ref 0–0.1)
BASOPHILS NFR BLD AUTO: 0.3 %
BUN SERPL-MCNC: 13 MG/DL (ref 8–23)
BUN SERPL-MCNC: 14 MG/DL (ref 8–23)
CALCIUM SERPL-MCNC: 8.8 MG/DL (ref 8.6–10)
CALCIUM SERPL-MCNC: 9.1 MG/DL (ref 8.6–10)
CHLORIDE SERPL-SCNC: 93 MEQ/L (ref 98–111)
CHLORIDE SERPL-SCNC: 96 MEQ/L (ref 98–111)
CHOLEST SERPL-MCNC: 183 MG/DL (ref 100–199)
CO2 SERPL-SCNC: 27 MEQ/L (ref 22–29)
CO2 SERPL-SCNC: 32 MEQ/L (ref 22–29)
CREAT SERPL-MCNC: 0.7 MG/DL (ref 0.7–1.2)
CREAT SERPL-MCNC: 0.7 MG/DL (ref 0.7–1.2)
DEPRECATED RDW RBC AUTO: 52.1 FL (ref 35–45)
DEPRECATED RDW RBC AUTO: 54.4 FL (ref 35–45)
ECHO BSA: 3.09 M2
EKG ATRIAL RATE: 62 BPM
EKG P-R INTERVAL: 194 MS
EKG Q-T INTERVAL: 396 MS
EKG QRS DURATION: 104 MS
EKG QTC CALCULATION (BAZETT): 401 MS
EKG R AXIS: -29 DEGREES
EKG T AXIS: -4 DEGREES
EKG VENTRICULAR RATE: 62 BPM
EOSINOPHIL NFR BLD AUTO: 2.1 %
EOSINOPHILS ABSOLUTE: 0.2 THOU/MM3 (ref 0–0.4)
ERYTHROCYTE [DISTWIDTH] IN BLOOD BY AUTOMATED COUNT: 15.4 % (ref 11.5–14.5)
ERYTHROCYTE [DISTWIDTH] IN BLOOD BY AUTOMATED COUNT: 15.9 % (ref 11.5–14.5)
GFR SERPL CREATININE-BSD FRML MDRD: > 90 ML/MIN/1.73M2
GFR SERPL CREATININE-BSD FRML MDRD: > 90 ML/MIN/1.73M2
GLUCOSE SERPL-MCNC: 112 MG/DL (ref 74–109)
GLUCOSE SERPL-MCNC: 116 MG/DL (ref 74–109)
HCT VFR BLD AUTO: 56.1 % (ref 42–52)
HCT VFR BLD AUTO: 56.6 % (ref 42–52)
HDLC SERPL-MCNC: 30 MG/DL
HGB BLD-MCNC: 17.2 GM/DL (ref 14–18)
HGB BLD-MCNC: 17.3 GM/DL (ref 14–18)
IAT IGG-SP REAG SERPL QL: NORMAL
IMM GRANULOCYTES # BLD AUTO: 0.02 THOU/MM3 (ref 0–0.07)
IMM GRANULOCYTES NFR BLD AUTO: 0.3 %
INR PPP: 1.12 (ref 0.85–1.13)
LDLC SERPL CALC-MCNC: 124 MG/DL
LYMPHOCYTES ABSOLUTE: 0.9 THOU/MM3 (ref 1–4.8)
LYMPHOCYTES NFR BLD AUTO: 11.2 %
MAGNESIUM SERPL-MCNC: 2.3 MG/DL (ref 1.6–2.6)
MCH RBC QN AUTO: 28.9 PG (ref 26–33)
MCH RBC QN AUTO: 28.9 PG (ref 26–33)
MCHC RBC AUTO-ENTMCNC: 30.4 GM/DL (ref 32.2–35.5)
MCHC RBC AUTO-ENTMCNC: 30.8 GM/DL (ref 32.2–35.5)
MCV RBC AUTO: 93.8 FL (ref 80–94)
MCV RBC AUTO: 95.1 FL (ref 80–94)
MONOCYTES ABSOLUTE: 0.8 THOU/MM3 (ref 0.4–1.3)
MONOCYTES NFR BLD AUTO: 9.9 %
NEUTROPHILS ABSOLUTE: 5.9 THOU/MM3 (ref 1.8–7.7)
NEUTROPHILS NFR BLD AUTO: 76.2 %
NRBC BLD AUTO-RTO: 0 /100 WBC
PLATELET # BLD AUTO: 149 THOU/MM3 (ref 130–400)
PLATELET # BLD AUTO: 151 THOU/MM3 (ref 130–400)
PLATELET BLD QL SMEAR: ADEQUATE
PMV BLD AUTO: 9.1 FL (ref 9.4–12.4)
PMV BLD AUTO: 9.3 FL (ref 9.4–12.4)
POTASSIUM SERPL-SCNC: 4 MEQ/L (ref 3.5–5.2)
POTASSIUM SERPL-SCNC: 4.1 MEQ/L (ref 3.5–5.2)
RBC # BLD AUTO: 5.95 MILL/MM3 (ref 4.7–6.1)
RBC # BLD AUTO: 5.98 MILL/MM3 (ref 4.7–6.1)
REASON FOR REJECTION: NORMAL
REJECTED TEST: NORMAL
RH BLD: NORMAL
SCAN OF BLOOD SMEAR: NORMAL
SODIUM SERPL-SCNC: 134 MEQ/L (ref 135–145)
SODIUM SERPL-SCNC: 134 MEQ/L (ref 135–145)
TRIGL SERPL-MCNC: 145 MG/DL (ref 0–199)
WBC # BLD AUTO: 7.7 THOU/MM3 (ref 4.8–10.8)
WBC # BLD AUTO: 8 THOU/MM3 (ref 4.8–10.8)

## 2025-05-14 PROCEDURE — B2111ZZ FLUOROSCOPY OF MULTIPLE CORONARY ARTERIES USING LOW OSMOLAR CONTRAST: ICD-10-PCS | Performed by: INTERNAL MEDICINE

## 2025-05-14 PROCEDURE — 2500000003 HC RX 250 WO HCPCS

## 2025-05-14 PROCEDURE — 85025 COMPLETE CBC W/AUTO DIFF WBC: CPT

## 2025-05-14 PROCEDURE — 86901 BLOOD TYPING SEROLOGIC RH(D): CPT

## 2025-05-14 PROCEDURE — 85027 COMPLETE CBC AUTOMATED: CPT

## 2025-05-14 PROCEDURE — 99232 SBSQ HOSP IP/OBS MODERATE 35: CPT | Performed by: PHYSICIAN ASSISTANT

## 2025-05-14 PROCEDURE — 94660 CPAP INITIATION&MGMT: CPT

## 2025-05-14 PROCEDURE — 2500000003 HC RX 250 WO HCPCS: Performed by: PHYSICIAN ASSISTANT

## 2025-05-14 PROCEDURE — 83735 ASSAY OF MAGNESIUM: CPT

## 2025-05-14 PROCEDURE — C1887 CATHETER, GUIDING: HCPCS | Performed by: INTERNAL MEDICINE

## 2025-05-14 PROCEDURE — 2500000003 HC RX 250 WO HCPCS: Performed by: INTERNAL MEDICINE

## 2025-05-14 PROCEDURE — 85730 THROMBOPLASTIN TIME PARTIAL: CPT

## 2025-05-14 PROCEDURE — 36415 COLL VENOUS BLD VENIPUNCTURE: CPT

## 2025-05-14 PROCEDURE — 2709999900 HC NON-CHARGEABLE SUPPLY: Performed by: INTERNAL MEDICINE

## 2025-05-14 PROCEDURE — 6360000002 HC RX W HCPCS: Performed by: STUDENT IN AN ORGANIZED HEALTH CARE EDUCATION/TRAINING PROGRAM

## 2025-05-14 PROCEDURE — 93460 R&L HRT ART/VENTRICLE ANGIO: CPT | Performed by: INTERNAL MEDICINE

## 2025-05-14 PROCEDURE — 99153 MOD SED SAME PHYS/QHP EA: CPT | Performed by: INTERNAL MEDICINE

## 2025-05-14 PROCEDURE — C1769 GUIDE WIRE: HCPCS | Performed by: INTERNAL MEDICINE

## 2025-05-14 PROCEDURE — 94761 N-INVAS EAR/PLS OXIMETRY MLT: CPT

## 2025-05-14 PROCEDURE — 2700000000 HC OXYGEN THERAPY PER DAY

## 2025-05-14 PROCEDURE — 85610 PROTHROMBIN TIME: CPT

## 2025-05-14 PROCEDURE — 86900 BLOOD TYPING SEROLOGIC ABO: CPT

## 2025-05-14 PROCEDURE — 86885 COOMBS TEST INDIRECT QUAL: CPT

## 2025-05-14 PROCEDURE — 93010 ELECTROCARDIOGRAM REPORT: CPT | Performed by: INTERNAL MEDICINE

## 2025-05-14 PROCEDURE — 2060000000 HC ICU INTERMEDIATE R&B

## 2025-05-14 PROCEDURE — 93005 ELECTROCARDIOGRAM TRACING: CPT | Performed by: PHYSICIAN ASSISTANT

## 2025-05-14 PROCEDURE — 2580000003 HC RX 258: Performed by: PHYSICIAN ASSISTANT

## 2025-05-14 PROCEDURE — 99152 MOD SED SAME PHYS/QHP 5/>YRS: CPT | Performed by: INTERNAL MEDICINE

## 2025-05-14 PROCEDURE — 6360000002 HC RX W HCPCS: Performed by: INTERNAL MEDICINE

## 2025-05-14 PROCEDURE — 4A023N8 MEASUREMENT OF CARDIAC SAMPLING AND PRESSURE, BILATERAL, PERCUTANEOUS APPROACH: ICD-10-PCS | Performed by: INTERNAL MEDICINE

## 2025-05-14 PROCEDURE — 6370000000 HC RX 637 (ALT 250 FOR IP): Performed by: PHYSICIAN ASSISTANT

## 2025-05-14 PROCEDURE — 80048 BASIC METABOLIC PNL TOTAL CA: CPT

## 2025-05-14 PROCEDURE — B2151ZZ FLUOROSCOPY OF LEFT HEART USING LOW OSMOLAR CONTRAST: ICD-10-PCS | Performed by: INTERNAL MEDICINE

## 2025-05-14 PROCEDURE — 6360000002 HC RX W HCPCS

## 2025-05-14 PROCEDURE — C1894 INTRO/SHEATH, NON-LASER: HCPCS | Performed by: INTERNAL MEDICINE

## 2025-05-14 PROCEDURE — 80061 LIPID PANEL: CPT

## 2025-05-14 PROCEDURE — 99232 SBSQ HOSP IP/OBS MODERATE 35: CPT | Performed by: INTERNAL MEDICINE

## 2025-05-14 PROCEDURE — 6360000004 HC RX CONTRAST MEDICATION: Performed by: INTERNAL MEDICINE

## 2025-05-14 RX ORDER — ATROPINE SULFATE 0.4 MG/ML
0.5 INJECTION, SOLUTION INTRAVENOUS
Status: DISCONTINUED | OUTPATIENT
Start: 2025-05-14 | End: 2025-05-15 | Stop reason: HOSPADM

## 2025-05-14 RX ORDER — MIDAZOLAM HYDROCHLORIDE 1 MG/ML
INJECTION, SOLUTION INTRAMUSCULAR; INTRAVENOUS PRN
Status: DISCONTINUED | OUTPATIENT
Start: 2025-05-14 | End: 2025-05-14 | Stop reason: HOSPADM

## 2025-05-14 RX ORDER — SODIUM CHLORIDE 0.9 % (FLUSH) 0.9 %
5-40 SYRINGE (ML) INJECTION EVERY 12 HOURS SCHEDULED
Status: DISCONTINUED | OUTPATIENT
Start: 2025-05-14 | End: 2025-05-15 | Stop reason: HOSPADM

## 2025-05-14 RX ORDER — ASPIRIN 325 MG
325 TABLET ORAL ONCE
Status: COMPLETED | OUTPATIENT
Start: 2025-05-14 | End: 2025-05-14

## 2025-05-14 RX ORDER — SODIUM CHLORIDE 9 MG/ML
INJECTION, SOLUTION INTRAVENOUS PRN
Status: DISCONTINUED | OUTPATIENT
Start: 2025-05-14 | End: 2025-05-15 | Stop reason: HOSPADM

## 2025-05-14 RX ORDER — LIDOCAINE HYDROCHLORIDE 20 MG/ML
INJECTION, SOLUTION EPIDURAL; INFILTRATION; INTRACAUDAL; PERINEURAL PRN
Status: DISCONTINUED | OUTPATIENT
Start: 2025-05-14 | End: 2025-05-14 | Stop reason: HOSPADM

## 2025-05-14 RX ORDER — SODIUM CHLORIDE 0.9 % (FLUSH) 0.9 %
5-40 SYRINGE (ML) INJECTION PRN
Status: DISCONTINUED | OUTPATIENT
Start: 2025-05-14 | End: 2025-05-15 | Stop reason: HOSPADM

## 2025-05-14 RX ORDER — NITROGLYCERIN 0.4 MG/1
0.4 TABLET SUBLINGUAL EVERY 5 MIN PRN
Status: DISCONTINUED | OUTPATIENT
Start: 2025-05-14 | End: 2025-05-15 | Stop reason: HOSPADM

## 2025-05-14 RX ORDER — IOPAMIDOL 755 MG/ML
INJECTION, SOLUTION INTRAVASCULAR PRN
Status: DISCONTINUED | OUTPATIENT
Start: 2025-05-14 | End: 2025-05-14 | Stop reason: HOSPADM

## 2025-05-14 RX ORDER — FENTANYL CITRATE 50 UG/ML
INJECTION, SOLUTION INTRAMUSCULAR; INTRAVENOUS PRN
Status: DISCONTINUED | OUTPATIENT
Start: 2025-05-14 | End: 2025-05-14 | Stop reason: HOSPADM

## 2025-05-14 RX ORDER — SODIUM CHLORIDE 0.9 % (FLUSH) 0.9 %
5-40 SYRINGE (ML) INJECTION PRN
Status: DISCONTINUED | OUTPATIENT
Start: 2025-05-14 | End: 2025-05-14 | Stop reason: SDUPTHER

## 2025-05-14 RX ORDER — SODIUM CHLORIDE 0.9 % (FLUSH) 0.9 %
5-40 SYRINGE (ML) INJECTION EVERY 12 HOURS SCHEDULED
Status: DISCONTINUED | OUTPATIENT
Start: 2025-05-14 | End: 2025-05-14 | Stop reason: SDUPTHER

## 2025-05-14 RX ORDER — SODIUM CHLORIDE 9 MG/ML
INJECTION, SOLUTION INTRAVENOUS CONTINUOUS
Status: DISCONTINUED | OUTPATIENT
Start: 2025-05-14 | End: 2025-05-15 | Stop reason: SDUPTHER

## 2025-05-14 RX ORDER — ACETAMINOPHEN 325 MG/1
650 TABLET ORAL EVERY 4 HOURS PRN
Status: DISCONTINUED | OUTPATIENT
Start: 2025-05-14 | End: 2025-05-15 | Stop reason: HOSPADM

## 2025-05-14 RX ORDER — SPIRONOLACTONE 25 MG/1
25 TABLET ORAL DAILY
Status: DISCONTINUED | OUTPATIENT
Start: 2025-05-14 | End: 2025-05-15 | Stop reason: HOSPADM

## 2025-05-14 RX ORDER — SODIUM CHLORIDE 9 MG/ML
INJECTION, SOLUTION INTRAVENOUS CONTINUOUS
Status: DISCONTINUED | OUTPATIENT
Start: 2025-05-14 | End: 2025-05-15 | Stop reason: HOSPADM

## 2025-05-14 RX ADMIN — BUMETANIDE 1 MG: 0.25 INJECTION INTRAMUSCULAR; INTRAVENOUS at 18:38

## 2025-05-14 RX ADMIN — SODIUM CHLORIDE, PRESERVATIVE FREE 10 ML: 5 INJECTION INTRAVENOUS at 10:04

## 2025-05-14 RX ADMIN — BUMETANIDE 1 MG: 0.25 INJECTION INTRAMUSCULAR; INTRAVENOUS at 10:03

## 2025-05-14 RX ADMIN — SODIUM CHLORIDE: 0.9 INJECTION, SOLUTION INTRAVENOUS at 08:25

## 2025-05-14 RX ADMIN — ASPIRIN 325 MG: 325 TABLET ORAL at 06:32

## 2025-05-14 RX ADMIN — ENOXAPARIN SODIUM 60 MG: 100 INJECTION SUBCUTANEOUS at 21:12

## 2025-05-14 RX ADMIN — SODIUM CHLORIDE, PRESERVATIVE FREE 10 ML: 5 INJECTION INTRAVENOUS at 21:12

## 2025-05-14 RX ADMIN — SPIRONOLACTONE 25 MG: 25 TABLET ORAL at 15:39

## 2025-05-14 RX ADMIN — PANTOPRAZOLE SODIUM 40 MG: 40 INJECTION, POWDER, FOR SOLUTION INTRAVENOUS at 10:03

## 2025-05-14 RX ADMIN — SODIUM CHLORIDE: 0.9 INJECTION, SOLUTION INTRAVENOUS at 01:39

## 2025-05-14 RX ADMIN — ENOXAPARIN SODIUM 60 MG: 100 INJECTION SUBCUTANEOUS at 10:03

## 2025-05-14 NOTE — CONSULTS
[facility]   HEART FAILURE PROGRAM          NAME:  Nilson Hanson  MEDICAL RECORD NUMBER:  [id number]  AGE: 54 y.o.   GENDER: male  : 1970  Attending: Alberto Trujillo DO TODAY'S DATE:  2025    Subjective:     VISIT TYPE: Heart Failure Education at bedside   Cardiac Medications       Potassium Sparing Diuretics       spironolactone (ALDACTONE) tablet 25 mg 25 mg, Oral, DAILY, Hold SBP <110  Hold Creatinine >2; Potassium >5       Nitrates       nitroGLYCERIN (NITROSTAT) SL tablet 0.4 mg 0.4 mg, SubLINGual, EVERY 5 MIN PRN, Place 1 tablet under tongue upon chest pain, wait 5 minutes and may repeat up to 3 doses in 15 minutes. Do not crush or break.        Loop Diuretics       bumetanide (BUMEX) injection 1 mg 1 mg, IntraVENous, 2 TIMES DAILY       Heparins And Heparinoid-Like Agents       enoxaparin (LOVENOX) injection 60 mg 60 mg, SubCUTAneous, 2 TIMES DAILY, Administer by deep subCUTAneous injection with pt lying down. Alternate injection sites on abdominal wall. Do not rub site after injection. Check with provider prior to any invasive procedure.            Results:   Is this patient a 30 day readmission patient: No      ADMISSION DIAGNOSIS:   Shortness of breath [R06.02]  Elevated troponin [R79.89]  SIRS (systemic inflammatory response syndrome) (HCC) [R65.10]  Acute respiratory failure with hypoxia and hypercapnia (HCC) [J96.01, J96.02]  Pneumonia of right lower lobe due to infectious organism [J18.9]     /63   Pulse 67   Temp 98.8 °F (37.1 °C) (Oral)   Resp 16   Ht 1.778 m (5' 10\")   Wt (!) 191.9 kg (423 lb)   SpO2 92%   BMI 60.69 kg/m²     ADMIT:  Weight - Scale: (!) 192.8 kg (425 lb)    TODAY: Weight - Scale: (!) 191.9 kg (423 lb)    Wt Readings from Last 5 Encounters:   25 (!) 191.9 kg (423 lb)   25 (!) 193.7 kg (427 lb)   10/27/21 (!) 157.7 kg (347 lb 10.7 oz)   05/14/21 (!) 147.9 kg (326 lb)          Intake/Output Summary (Last 24 hours) at

## 2025-05-14 NOTE — PROCEDURES
PROCEDURE NOTE  Date: 5/14/2025   Name: Nilson Hanson  YOB: 1970    Procedures  12 lead EKG completed. Results handed to Juno BERNARD.

## 2025-05-14 NOTE — BRIEF OP NOTE
[facility] [place][note 2]  Sedation/Analgesia Post Sedation Record    Pt Name: Nilson Hanson  Account number: [id number]  MRN: [id number]  YOB: 1970  Procedure Performed By: Macario Valladares MD MD FACC, [facility], RPVI  Primary Care Physician: Lai Irvin MD  Date: 5/14/2025    POST-PROCEDURE    Physicians/Assistants: Macario Valladares MD, [facility], [facility], [facility]    Procedure Performed: Cor Angio/RHC    Sedation/Anesthesia: Versed/ Fentanyl and 2% xylocaine local anesthesia.      Estimated Blood Loss: < 50 ml.     Specimens Removed: None         Disposition of Specimen: N/A        Complications: No Immediate Complications.       Post-procedure Diagnosis/Findings:       GDMT   RF management  Elevated WP and elevated CVP, continue diuresis  PVR ~2      Electronically signed by Macario Valladares MD on 5/14/25 at 8:02 AM EDT   Interventional Cardiology

## 2025-05-14 NOTE — H&P
[facility] [place]  Sedation/Analgesia History & Physical    Pt Name: Nilson Hanson  Account number: [id number]  MRN: [id number]  YOB: 1970  Provider Performing Procedure: Macario Valladares MD MD  Referring Provider: Alberto Trujillo DO   Primary Care Physician: Lai Irvin MD  Date: 5/14/2025    PRE-PROCEDURE    Code Status: FULL CODE  Brief History/Pre-Procedure Diagnosis:   CHF  Right heart failure    Consent: : I have discussed with the patient risks, benefits, and alternatives (and relevant risks, benefits, and side effects related to alternatives or not receiving care), and likelihood of the success.   The patient and/or representative appear to understand and agree to proceed.  The discussion encompasses risks, benefits, and side effects related to the alternatives and the risks related to not receiving the proposed care, treatment, and services.     The indication, risks and benefits of the procedure and possible therapeutic consequences and alternatives were discussed with the patient. The patient was given the opportunity to ask questions and to have them answered to his/her satisfaction. Risks of the procedure include but are not limited to the following: Bleeding, hematoma including retroperitoneal hemmorhage, infection, pain and discomfort, injury to the aorta and other blood vessels, rhythm disturbance, low blood pressure, myocardial infarction, stroke, kidney damage/failure, myocardial perforation, allergic reactions to sedatives/contrast material, loss of pulse/vascular compromise requiring surgery, aneurysm/pseudoaneurysm formation, possible loss of a limb/hand/leg, needing blood transfusion, requiring emergent open heart surgery or emergent coronary intervention, the need for intubation/respiratory support, the requirement for defibrillation/cardioversion, and death. Alternatives to and omission of the suggested procedure were discussed. The patient had no further

## 2025-05-14 NOTE — CARE COORDINATION
5/14/25, 2:15 PM EDT    DISCHARGE ON GOING EVALUATION    [facility] day: 5  Location: -15/015-A Reason for admit: Shortness of breath [R06.02]  Elevated troponin [R79.89]  SIRS (systemic inflammatory response syndrome) (HCC) [R65.10]  Acute respiratory failure with hypoxia and hypercapnia (HCC) [J96.01, J96.02]  Pneumonia of right lower lobe due to infectious organism [J18.9]     Barriers to Discharge:   From [facility] to ICU (ventilator there)  PNA/CHF  PMH: DM, Pulmonary HTN, Ascites, CHF  Oxygen 3L  IV Diuresing  IV PPI  IVF  Await 5/15 0830 BIPAP Training  Procedures:  5/14 Cardiac Cath; patent coronaries  Patient Goals/Plan/Treatment Preferences: plans home alone, has SO/POA Laura, still drives; new [facility] BIPAP, new CHF Clinic; monitor oxygen needs  PCP: Lai Irvin MD  Readmission Risk Score: 11

## 2025-05-15 VITALS
RESPIRATION RATE: 19 BRPM | OXYGEN SATURATION: 93 % | DIASTOLIC BLOOD PRESSURE: 74 MMHG | WEIGHT: 315 LBS | BODY MASS INDEX: 45.1 KG/M2 | HEART RATE: 81 BPM | TEMPERATURE: 98.1 F | SYSTOLIC BLOOD PRESSURE: 123 MMHG | HEIGHT: 70 IN

## 2025-05-15 LAB
ALBUMIN SERPL BCG-MCNC: 3.2 G/DL (ref 3.4–4.9)
ALP SERPL-CCNC: 82 U/L (ref 40–129)
ALT SERPL W/O P-5'-P-CCNC: 127 U/L (ref 10–50)
ANION GAP SERPL CALC-SCNC: 10 MEQ/L (ref 8–16)
AST SERPL-CCNC: 40 U/L (ref 10–50)
BASOPHILS ABSOLUTE: 0 THOU/MM3 (ref 0–0.1)
BASOPHILS NFR BLD AUTO: 0.5 %
BILIRUB CONJ SERPL-MCNC: 0.4 MG/DL (ref 0–0.2)
BILIRUB SERPL-MCNC: 0.9 MG/DL (ref 0.3–1.2)
BUN SERPL-MCNC: 13 MG/DL (ref 8–23)
CALCIUM SERPL-MCNC: 9.3 MG/DL (ref 8.6–10)
CHLORIDE SERPL-SCNC: 96 MEQ/L (ref 98–111)
CO2 SERPL-SCNC: 29 MEQ/L (ref 22–29)
CREAT SERPL-MCNC: 0.7 MG/DL (ref 0.7–1.2)
DEPRECATED RDW RBC AUTO: 53.1 FL (ref 35–45)
EOSINOPHIL NFR BLD AUTO: 2.3 %
EOSINOPHILS ABSOLUTE: 0.2 THOU/MM3 (ref 0–0.4)
ERYTHROCYTE [DISTWIDTH] IN BLOOD BY AUTOMATED COUNT: 15.9 % (ref 11.5–14.5)
GFR SERPL CREATININE-BSD FRML MDRD: > 90 ML/MIN/1.73M2
GLUCOSE SERPL-MCNC: 97 MG/DL (ref 74–109)
HCT VFR BLD AUTO: 56.5 % (ref 42–52)
HGB BLD-MCNC: 17.6 GM/DL (ref 14–18)
IMM GRANULOCYTES # BLD AUTO: 0.02 THOU/MM3 (ref 0–0.07)
IMM GRANULOCYTES NFR BLD AUTO: 0.3 %
LYMPHOCYTES ABSOLUTE: 0.9 THOU/MM3 (ref 1–4.8)
LYMPHOCYTES NFR BLD AUTO: 11 %
MAGNESIUM SERPL-MCNC: 2.5 MG/DL (ref 1.6–2.6)
MCH RBC QN AUTO: 29 PG (ref 26–33)
MCHC RBC AUTO-ENTMCNC: 31.2 GM/DL (ref 32.2–35.5)
MCV RBC AUTO: 93.1 FL (ref 80–94)
MONOCYTES ABSOLUTE: 0.9 THOU/MM3 (ref 0.4–1.3)
MONOCYTES NFR BLD AUTO: 11 %
NEUTROPHILS ABSOLUTE: 5.9 THOU/MM3 (ref 1.8–7.7)
NEUTROPHILS NFR BLD AUTO: 74.9 %
NRBC BLD AUTO-RTO: 0 /100 WBC
NT-PROBNP SERPL IA-MCNC: 191 PG/ML (ref 0–124)
PLATELET # BLD AUTO: 164 THOU/MM3 (ref 130–400)
PMV BLD AUTO: 9.2 FL (ref 9.4–12.4)
POTASSIUM SERPL-SCNC: 4.1 MEQ/L (ref 3.5–5.2)
PROT SERPL-MCNC: 7.1 G/DL (ref 6.4–8.3)
RBC # BLD AUTO: 6.07 MILL/MM3 (ref 4.7–6.1)
SODIUM SERPL-SCNC: 135 MEQ/L (ref 135–145)
WBC # BLD AUTO: 7.9 THOU/MM3 (ref 4.8–10.8)

## 2025-05-15 PROCEDURE — 36415 COLL VENOUS BLD VENIPUNCTURE: CPT

## 2025-05-15 PROCEDURE — 99232 SBSQ HOSP IP/OBS MODERATE 35: CPT | Performed by: PHYSICIAN ASSISTANT

## 2025-05-15 PROCEDURE — 6360000002 HC RX W HCPCS: Performed by: STUDENT IN AN ORGANIZED HEALTH CARE EDUCATION/TRAINING PROGRAM

## 2025-05-15 PROCEDURE — 83735 ASSAY OF MAGNESIUM: CPT

## 2025-05-15 PROCEDURE — 94660 CPAP INITIATION&MGMT: CPT

## 2025-05-15 PROCEDURE — 83880 ASSAY OF NATRIURETIC PEPTIDE: CPT

## 2025-05-15 PROCEDURE — 82248 BILIRUBIN DIRECT: CPT

## 2025-05-15 PROCEDURE — 2700000000 HC OXYGEN THERAPY PER DAY

## 2025-05-15 PROCEDURE — 6360000002 HC RX W HCPCS

## 2025-05-15 PROCEDURE — 85025 COMPLETE CBC W/AUTO DIFF WBC: CPT

## 2025-05-15 PROCEDURE — 6370000000 HC RX 637 (ALT 250 FOR IP)

## 2025-05-15 PROCEDURE — 94761 N-INVAS EAR/PLS OXIMETRY MLT: CPT

## 2025-05-15 PROCEDURE — 6370000000 HC RX 637 (ALT 250 FOR IP): Performed by: PHYSICIAN ASSISTANT

## 2025-05-15 PROCEDURE — 80053 COMPREHEN METABOLIC PANEL: CPT

## 2025-05-15 PROCEDURE — 2500000003 HC RX 250 WO HCPCS: Performed by: INTERNAL MEDICINE

## 2025-05-15 RX ORDER — SENNOSIDES A AND B 8.6 MG/1
1 TABLET, FILM COATED ORAL ONCE
Status: COMPLETED | OUTPATIENT
Start: 2025-05-15 | End: 2025-05-15

## 2025-05-15 RX ORDER — BUMETANIDE 1 MG/1
1 TABLET ORAL 2 TIMES DAILY
Qty: 60 TABLET | Refills: 0 | Status: SHIPPED | OUTPATIENT
Start: 2025-05-15

## 2025-05-15 RX ORDER — POLYETHYLENE GLYCOL 3350 17 G/17G
17 POWDER, FOR SOLUTION ORAL DAILY
Status: DISCONTINUED | OUTPATIENT
Start: 2025-05-15 | End: 2025-05-15 | Stop reason: HOSPADM

## 2025-05-15 RX ORDER — SPIRONOLACTONE 25 MG/1
25 TABLET ORAL DAILY
Qty: 30 TABLET | Refills: 3 | Status: SHIPPED | OUTPATIENT
Start: 2025-05-16

## 2025-05-15 RX ORDER — BUMETANIDE 1 MG/1
2 TABLET ORAL 2 TIMES DAILY
Qty: 120 TABLET | Refills: 0 | Status: SHIPPED | OUTPATIENT
Start: 2025-05-15 | End: 2025-05-15

## 2025-05-15 RX ORDER — POLYETHYLENE GLYCOL 3350 17 G/17G
17 POWDER, FOR SOLUTION ORAL DAILY
Qty: 30 PACKET | Refills: 0 | Status: SHIPPED | OUTPATIENT
Start: 2025-05-16 | End: 2025-06-15

## 2025-05-15 RX ADMIN — ENOXAPARIN SODIUM 60 MG: 100 INJECTION SUBCUTANEOUS at 08:06

## 2025-05-15 RX ADMIN — BUMETANIDE 1 MG: 0.25 INJECTION INTRAMUSCULAR; INTRAVENOUS at 08:06

## 2025-05-15 RX ADMIN — SPIRONOLACTONE 25 MG: 25 TABLET ORAL at 08:06

## 2025-05-15 RX ADMIN — SENNOSIDES 8.6 MG: 8.6 TABLET, FILM COATED ORAL at 11:44

## 2025-05-15 RX ADMIN — SODIUM CHLORIDE, PRESERVATIVE FREE 10 ML: 5 INJECTION INTRAVENOUS at 08:01

## 2025-05-15 NOTE — DISCHARGE INSTRUCTIONS
BMP 1 week    You were started on new medications. Take bumex twice daily. Take spironolactone/aldactone once daily. I ordered a script for miralax, take as needed for constipation.     Follow up with PCP for continuation of care. Follow up with CHF clinic. Follow up with Pulmonology clinic.     Wear the BiPAP with all naps and sleeping at night.       Discharge Instructions for Radial Heart Catherization  1.  Take it easy for 3-4 days.  2.  No driving for 2 days.  3.  No lifting of 5 lbs or more for 5 days with the affected arm.  4.  May shower after 24 hours.  5.  Remove arm board after 24 hours.  6.  Apply a band aid to the insertion site daily for 5 days.  Wash site daily with soap and water.  7.  No creams, ointments, or powders near the insertion site.   8.  No tub baths, swimming, hot tubs, or hand washing dishes for 1 week.  9.  Watch for signs of infection (redness, warmth, swelling, or pus drainage) or coolness of extremity and call physician if this occurs  10.  If bleeding occurs from insertion site, apply pressure and call 911.   11. Watch for numbness/tingling- if this occurs go to the Emergency Room immediately.  12. Apply Ice for 15 minutes with an hour break in between and alternate with heat compress for 15 minutes to reduce swelling for 3-5 days.

## 2025-05-15 NOTE — PROGRESS NOTES
Patient Weaning Progress    The patient's vent settings was able to be weaned this shift.      Ventilator settings that were weaned              [] Mode   [x] Pressure support weaned   [x] Fio2 weaned   [x] Peep weaned      Spontaneous weaning trial  was not attempted.     due to defined parameters for SBT (spontaneous breathing trial) not being met.       Reason that defined ventilator parameters for SBT was not met              [] Patient condition requires increased ventilator settings  [] Requires increased sedation   [x] Settings not within weaning range   [] SAT not completed   [] Physician orders        Evac tube was  hooked up with continuous low suction(20-30mmHg)        Family mutually agreed on goals.    Unable to get agreement for goals because no family is present and patient cannot respond.     
    Hospitalist Progress Note  Internal Medicine Resident      Patient: Nilson Hanson 54 y.o. male      Unit/Bed: -15/015-A    Admit Date: 5/9/2025      ASSESSMENT AND PLAN  Active Problems  Acute Hypoxic and Hypercapnic Respiratory Failure: Suspect 2/2 pulmonary HTN. Initially required intubation after failing BiPAP, now on BiPAP at night and with naps. Pulmonology following, with study proving need for BiPAP on discharge. Echo showed EF 50-55% with severe systolic dysfunction of right ventricle. Bumex with diamox to help with pulmonary edema. Wean supplemental oxygen as tolerated with goal SPO2 >92%.   Suspected Obesity Hypoventilation Syndrome vs ELY: Mallampati IV airway, BMI 60.7, not feeling refreshed with sleep, snoring, excessive daytime sleepiness. Pulmonology following, with trial proving need for BiPAP outpatient. Will need formal sleep evaluation outpatient.  Cor Pulmonale, with suspected Pulmonary Hypertension: RVSP 41 mmHg. CTA chest showed contrast back-flowing into right ventricle and IVC. Echo showed severe right ventricle systolic function. Risk of cardiac vs pulmonary cause, potentially from untreated ELY. Will need repeat echo outpatient in 3 months for re-evaluation of RVSP. Bumex 1 mg twice daily. Cardiology consulted, plan for RHC/LHC tomorrow. Npo at midnight.   Ascites: Hepatomegaly, steatosis and ascites noted on CTA. LFTs elevated. Suspect 2/2 right heart failure, however would need to evaluate ascitic fluid to further differentiate between hepatic vs cardiac ascites. Continue to diurese with bumex 1 mg IV twice daily.   Post hypercapnic metabolic alkalosis: As bicarb can be slower to return to normal after compensating for respiratory acidosis and being on bumex, will give 1 x dose of diamox again today to further augment bicarb uresis.   Transaminitis, improving: hepatocellular pattern. Likely MASLD and/or congestive hepatopathy. Hepatitis panel negative. Denies alcohol use. Continue 
    Hospitalist Progress Note  Internal Medicine Resident      Patient: Nilson Hanson 54 y.o. male      Unit/Bed: -15/015-A    Admit Date: 5/9/2025      ASSESSMENT AND PLAN  Active Problems  Acute Hypoxic and Hypercapnic Respiratory Failure: Suspect 2/2 pulmonary HTN. Initially required intubation after failing BiPAP, now on BiPAP at night and with naps. Pulmonology following. Echo showed EF 50-55% with severe systolic dysfunction of right ventricle.   Suspected Obesity Hypoventilation Syndrome vs ELY: Mallampati IV airway, BMI 60.7, not feeling refreshed with sleep, snoring, excessive daytime sleepiness. Pulmonology following, with plan to trial for BiPAP qualification prior to discharge. Will need formal sleep evaluation outpatient.  Pulmonary Hypertension: RVSP 41 mmHg. CTA chest showed contrast back-flowing into right ventricle and IVC. Echo showed severe right ventricle systolic function. Risk of cardiac vs pulmonary cause, potentially from untreated ELY. Will need repeat echo outpatient in 3 months for re-evaluation of RVSP. Bumex 1 mg twice daily. Would need RHC for definitive diagnosis once dry weight.   Ascites: Hepatomegaly, steatosis and ascites noted on CTA. LFTs elevated. Suspect 2/2 right heart failure, however would need to evaluate ascitic fluid to further differentiate between hepatic vs cardiac ascites. Hepatitis panel negative. Continue to diurese with bumex 1 mg IV twice daily.   Elevated troponin: Troponin 53->51. EKG negative for ischemic changes. Likely demand in setting of right heart strain. Will obtain STAT EKG and troponin if CP develops.   Resolved Problems  JULIO  DVT ruled out  Hypotension  Chronic Conditions (reviewed and stable unless otherwise stated)  T2DM: HbA1c 7.1%. Diet controlled outpatient. As glucose has been low, will hold off on ordering insulin. Hypoglycemia protocol. POCT glucose checks.       LDA: []CVC / []PICC / []Midline / []De La Cruz / []Drains / []Mediport / 
    Hospitalist Progress Note  Internal Medicine Resident      Patient: Nilson Hanson 54 y.o. male      Unit/Bed: -15/015-A    Admit Date: 5/9/2025      ASSESSMENT AND PLAN  Active Problems  Acute Hypoxic and Hypercapnic Respiratory Failure: Suspect 2/2 pulmonary HTN. Initially required intubation after failing BiPAP, now on BiPAP at night and with naps. Pulmonology following. Echo showed EF 50-55% with severe systolic dysfunction of right ventricle. Bumex with diamox to help with pulmonary edema. Wean supplemental oxygen as tolerated with goal SPO2 >92%.   Suspected Obesity Hypoventilation Syndrome vs ELY: Mallampati IV airway, BMI 60.7, not feeling refreshed with sleep, snoring, excessive daytime sleepiness. Pulmonology following, with plan to trial for BiPAP qualification prior to discharge. Will need formal sleep evaluation outpatient.  Pulmonary Hypertension: RVSP 41 mmHg. CTA chest showed contrast back-flowing into right ventricle and IVC. Echo showed severe right ventricle systolic function. Risk of cardiac vs pulmonary cause, potentially from untreated ELY. Will need repeat echo outpatient in 3 months for re-evaluation of RVSP. Bumex 1 mg twice daily. Would need RHC for definitive diagnosis and possible LHC, Cardiology consulted.   Ascites: Hepatomegaly, steatosis and ascites noted on CTA. LFTs elevated. Suspect 2/2 right heart failure, however would need to evaluate ascitic fluid to further differentiate between hepatic vs cardiac ascites. Continue to diurese with bumex 1 mg IV twice daily.   Post hypercapnic metabolic alkalosis: As bicarb can be slower to return to normal after compensating for respiratory acidosis and being on bumex, will give 1 x dose of diamox today to further augment bicarb uresis.   Transaminitis: hepatocellular pattern. Likely MASLD and/or congestive hepatopathy. Hepatitis panel negative. Denies alcohol use. Continue to monitor.   Elevated troponin: Troponin 53->51. EKG negative 
  CRITICAL CARE PROGRESS NOTE      Patient:  Nilson Hanson    Unit/Bed:4D-15/015-A  YOB: 1970  MRN: [id number]   PCP: Lai Irvin MD  Date of Admission: 5/9/2025  Chief Complaint:- Fatigue and shortness of breath     Assessment and Plan:    Acute hypoxic and hypercapnic on likely chronic respiratory failure: In ED patient put on BIPAP for 30 minutes and had worsening stupor and not responding to voice. VBG pH 7.11, with signficant hypercapnia. Patient intubated on 05/09 for airway protection. Repeat ABG 05/09 pH 7.39, pCO2 70, pO2 94, HCO3 42. Pneumonia and resp panel negative.   Patient extubated to BIPAP 05/10.   GI prophylaxis IV 40mg Protonix    Albuterol nebs PRN  CXR ordered this morning.   2. Acute heart failure exacerbation- BNP 6812. CXR shows cephalization and edema. ECHO 05/09 EF 50-55%. RV severely dilated and severely reduced systolic function. RVSP 41 mmHg. RA severely dilated   -Bumex drip stopped 05/10. Started on IV Bumex 2mg BID.    -Strict I/O and daily weights  3. Concern for ELY and obesity hypoventilation syndrome:  No previous sleep studies. Was awaiting appointment with [facility] sleep. Once sedation weaned to start on modafinil in the morning and progesterone in evening. Patient knows needs to follow up with pulmonary and sleep center.     4. Ascites-Hepatomegaly, steatosis and ascites noted on CTA. , , , Total Bili 1.1. INR 1.46. Patient has positive fluid wave. Notes worsening abdominal distention Suspect due to RHF.  Ammonia 46. Monitor with diuresis. Repeat LFTs this morning.     5. JULIO, resolved- Cr 1.3. Last Cr 2021 0.8. Urine Na and creatinine ordered.   6. Concern for DVT:  Hx of chronic left leg edema from foot to hip. Noted in family medicine note. Had referral to vein clinic, but did not follow through. CTA ruled out PE. B/L Doppler no DVT. Heparin stopped on 05/09.   7. Elevated Troponin- Troponin 53. EKG did not show any ischemic changes. 
1945 Laura [facility] called and updated that patient was transferred to [facility]. No further questions at this time.  
A home oxygen evaluation has been completed.     [x]Patient is an inpatient. It is expected that the patient will be discharged within the next 48 hours. Qualified provider to write order for home prescription if patient qualifies. Social service/care managers will arrange for home oxygen.  If patient is active, arrange for Home Medical supplier to assess for Oxygen Conserving Device per pulse oximetry.      Patient was placed on room air for 45 minutes. SpO2 was 90 % on room air at rest. t can ambulate for exercise flow rate. Patients was ambulated, SpO2 was 90% on 1 lpm to maintain SpO2 between 90-92% while exercising.    While ambulating on room air with no O2 SpO2 dropped to 87%         Note: For any SpO2 at 89% see policy and procedure for possible qualifications.  
CLINICAL PHARMACY: DISCHARGE MED RECONCILIATION/REVIEW    [facility] Select Patient?: No  Total # of Interventions Recommended: 0     Total # Interventions Accepted: 0  Intervention Severity:   - Level 1 Intervention Present?: No   - Level 2 #: 0   - Level 3 #: 0   Time Spent (min): 15    Nina Callaway, PharmD  5/15/2025 at 1:07 PM  
Cardiology Progress Note      Patient:  Nilson Hanson  YOB: 1970  MRN: [id number]   Acct: [id number]  Admit Date:  5/9/2025  Primary Cardiologist:  none    Note per dr jacques \"CHIEF COMPLAINT: Shortness of breath        HPI: This is a pleasant 54 y.o. male PMH notable for ELY, type 2 diabetes, obesity presents with shortness of breath and fatigue.  Patient presented with a complaint of difficulty staying awake while driving a .  Patient was scheduled for outpatient sleep evaluation.  Arrival to the ED patient was found to be hypoxic and hypercapnic.  Per record he failed BiPAP secondary to lethargy and required intubation.  Patient was extubated on 5/10/2025 to BiPAP.  BiPAP was able to be weaned to night and daytime naps.  Echo evaluation demonstrated EF 50 to 55% with severe right ventricle systolic dysfunction and RVSP of 41 mmHg.  CTA chest was negative for PE but did demonstrate backflow of contrast into the right ventricle and IVC.  Patient also noted to have ascites on imaging however not enough to drain and evaluate.  Cardiology was consulted for possible right heart cath in the setting of pulmonary hypertension.\"    Subjective (Events in last 24 hours): pt awake and alert.  NAD. No cp or sob. Tired today.  Requiring bipap due to elevated CO2 levels.  No edema.  Feels better with bipap on     Net I/o -14.3 L    Objective:   BP (!) 108/59   Pulse 65   Temp 99 °F (37.2 °C) (Oral)   Resp 24   Ht 1.778 m (5' 10\")   Wt (!) 191.9 kg (423 lb)   SpO2 91%   BMI 60.69 kg/m²        TELEMETRY: nsr    Physical Exam:  General Appearance: alert and oriented to person, place and time, in no acute distress  Cardiovascular: normal rate, regular rhythm, normal S1 and S2, no murmurs, rubs, clicks, or gallops, distal pulses intact, no carotid bruits, no JVD  Pulmonary/Chest: clear to auscultation bilaterally- no wheezes, rales or rhonchi, normal air movement, no respiratory 
Cardiology Progress Note      Patient:  Nilson Hanson  YOB: 1970  MRN: [id number]   Acct: [id number]  Admit Date:  5/9/2025  Primary Cardiologist:  none    Note per dr jacques \"CHIEF COMPLAINT: Shortness of breath        HPI: This is a pleasant 54 y.o. male PMH notable for ELY, type 2 diabetes, obesity presents with shortness of breath and fatigue.  Patient presented with a complaint of difficulty staying awake while driving a .  Patient was scheduled for outpatient sleep evaluation.  Arrival to the ED patient was found to be hypoxic and hypercapnic.  Per record he failed BiPAP secondary to lethargy and required intubation.  Patient was extubated on 5/10/2025 to BiPAP.  BiPAP was able to be weaned to night and daytime naps.  Echo evaluation demonstrated EF 50 to 55% with severe right ventricle systolic dysfunction and RVSP of 41 mmHg.  CTA chest was negative for PE but did demonstrate backflow of contrast into the right ventricle and IVC.  Patient also noted to have ascites on imaging however not enough to drain and evaluate.  Cardiology was consulted for possible right heart cath in the setting of pulmonary hypertension.\"    Subjective (Events in last 24 hours):  Pt sleeping with bipap on.  Arousable and wakes up.  NAD. No cp or sob. Edema mostly resolved  On 3 l/min O2    Net I/o -14.5 L - some output not documented yesterday    Objective:   /71   Pulse 68   Temp 99 °F (37.2 °C) (Oral)   Resp 16   Ht 1.778 m (5' 10\")   Wt (!) 191.9 kg (423 lb)   SpO2 91%   BMI 60.69 kg/m²        TELEMETRY: nsr    Physical Exam:  General Appearance: alert and oriented to person, place and time, in no acute distress  Cardiovascular: normal rate, regular rhythm, normal S1 and S2, no murmurs, rubs, clicks, or gallops, distal pulses intact, no carotid bruits, no JVD  Pulmonary/Chest: clear to auscultation bilaterally- no wheezes, rales or rhonchi, normal air movement, no respiratory 
Comprehensive Nutrition Assessment    Type and Reason for Visit:  Initial, Positive nutrition screen (wound)    Nutrition Recommendations/Plan:   I perfect served Dr Newman Waterhouse the following: I notice Type II DM noted in chart & pt reports he has never been told he has it. I also notice pt ordered a regular diet. Do you want pt on a 1800 kcal carb controlled low sodium diet ? If so, would you please order a dietary consult & adjust the diet orders? Thanks!   Monitor po, diet, labs & wt.  Send Jones BID      Malnutrition Assessment:  Malnutrition Status:  No malnutrition (05/12/25 9657)    Context:  Acute Illness     Findings of the 6 clinical characteristics of malnutrition:  Energy Intake:  No decrease in energy intake  Weight Loss:  Unable to assess (Bumex pt, HF pt, fluid shifts/edema)     Body Fat Loss:  No body fat loss     Muscle Mass Loss:  No muscle mass loss    Fluid Accumulation:  Moderate to Severe Extremities   Strength:  Not Performed    Nutrition Assessment:     Pt. nutritionally compromised AEB wounds.  At risk for further nutrition compromise r/t increased nutrient needs for wound healing, admit with acute hypoxic & hypercapnic respiratory failure due to acute HF, exacerbation vs ELY/OHS, PHTN, small bilateral pleural effusions, morbid obesity, Type II DM, JULIO.       Nutrition Related Findings:    Pt. Report/Treatments/Miscellaneous: Pt seen ~1030, mentions he consumed > 75% at breakfast however did not receive food he ordered- filled out meal report. Pt appears obese, reports good appetite& does the cooking, & appears to follow a regular diet at home. Pt reports has not been told he has DM & I notice pt on regular diet. Diet appears to be high in protein such as steak & 5-6 eggs & sausage for breakfast. Diet appears to lack fruit & vegetables.   GI Status: No BM  Pertinent Labs: (5/12) , AST 64, Total Bilirubin 1.3 (5/10)Bilirubin Direct 0.5  Pertinent Meds: Bumex, Lovenox     
Patient arrived to unit from ED via bed. Patient transferred to ICU bed and placed on continuous ICU bedside monitor. Patient admitted for Shortness of breath [R06.02]  Elevated troponin [R79.89]  SIRS (systemic inflammatory response syndrome) (HCC) [R65.10]  Acute respiratory failure with hypoxia and hypercapnia (HCC) [J96.01, J96.02]  Pneumonia of right lower lobe due to infectious organism [J18.9]. Vitals obtained. See flowsheets. Patient's IV access includes 20 g left hand, 20 g right AC, right IJ CVC. Current infusions and rates of infusion include Levo @ 3 mcg/min, heparin @ 10 u/kg/hr, fentanyl @ 75 mcg/min, versed at 2 mg/hr. Assessment completed by ANA ROSA Andino RN. Two nurse skin assessment completed by ANA ROSA Andino RN and SERGIO Andino RN. See flowsheets for assessment details. Policies and procedures of ICU unable to be explained to patient at this time. Family member(s)/representative(s) present at time of admission include NA. Patient rights explained to family member(s)/representatives and patient, as able. Patient/patient's family member(s)/representative(s) N/A to have physician notified of their admission. All questions posed by patient's family member(s)/representative(s) and patient answered at this time.     
Patient has been successfully weaned from Mechanical Ventilation.  RSBI before extubation was 33 with EtCO2 of 60 and SpO2 of 100 on 28% FiO2.  Patient extubated and placed on bipap.  Post extubation SpO2 is 100% with HR  40 bpm and RR 16 breaths/min.  Patient had strong cough that was productive of yellow sputum.  Extubation Well tolerated by patient..  
Patient received sacramental anointing by a . If you are in need of  support, please call [phone]. If you are in need of a  after 6:30pm, please call the house supervisor for the on-call .      Janette Dunn  [facility] Health Coordinator  [phone]   
Patient's sister, Gauri, requested that patient's belongings including a pair of shoes, socks, a shirt, pants, shorts, a belt, a flannel jacket, and a watch stay in the patient's room. I explained the risk that these belongings could be lost in the shuffle of moving patients, but they are okay with this and would like to leave it here.    1500 Reached out to patient's listed POALaura. She is aware of the patient's admission and current plan of care. No further questions at this time. She states she will remain available for decision making as needed.   
Pharmacy Medication History Note    List of current medications patient is taking is complete.    Source of information: patient     Changes made to medication list:  Medications marked as not taking (include reason, ex. therapy complete or physician discontinued):  none    Medications added/doses adjusted:  none    Allergies reviewed    Patient reports difficulty swallowing any capsules and will likely refuse any capsules.  Reports able to swallow tablets, but only if taken individually.      Electronically signed by Kena Coleman RPH on 5/12/2025 at 9:59 AM       
RT obtained ABG this am and critical results perfect served to Dr. Mccoy, per provider to place pt back on bipap at this time and obtain ABG in three hours. Plan to have pt go to cath lab for procedure after pt ABG improves. RN aware and RN notified cath lab at this time, cath lab would like pt to be off bipap throughout procedure.  
Spirometry completed bedside. Results under chart review/procedures tab.   
  Transaminitis, improving: hepatocellular pattern. Likely MASLD and/or congestive hepatopathy. Hepatitis panel negative. Denies alcohol use. Continue to monitor.   Elevated troponin: Troponin 53->51. EKG negative for ischemic changes. Likely demand in setting of right heart strain. Will obtain STAT EKG and troponin if CP develops.   Resolved Problems  JULIO  DVT ruled out  Hypotension  Qtc prolongation, resolved on repeat EKG  Chronic Conditions (reviewed and stable unless otherwise stated)  T2DM: HbA1c 7.1%. Diet controlled outpatient. As glucose has been low, will hold off on ordering insulin. Hypoglycemia protocol. POCT glucose checks.   Class 3 Obesity: BMI 60.69      LDA: []CVC / []PICC / []Midline / []De La Cruz / []Drains / []Mediport / [x]None  Antibiotics: none  Steroids: none  Labs (still needed?): [x]Yes / []No  IVF (still needed?): []Yes / [x]No    Level of care: [x]Step Down / []Med-Surg  Bed Status: [x]Inpatient / []Observation  Telemetry: [x]Yes / []No  PT/OT: []Yes / [x]No    DVT Prophylaxis: [x] Lovenox / [] Heparin / [] SCDs / [] Already on Systemic Anticoagulation / [] None     Expected discharge date:  TBD  Disposition: Home     Code status: Full Code     ===================================================================    Chief Complaint: Shortness of breath/Fatigue  Subjective (past 24 hours): Back from [facility] this morning. Patient states it went well, was not aware of any needed interventions. Feeling more refreshed from the BiPAP use overnight. Denies any shortness of breath on 5 LNC, will wean oxygen today.     HPI / Hospital Course:  Mr. Hanson is a 53 yo male with pmhx of T2DM that presented to [facility] with shortness of breath and worsening fatigue. He is a  and has been noticing it difficult to stay awake while driving. Was scheduled for outpatient sleep evaluation, but presented to ED prior due to worsening symptoms. Patient was hypoxic and hypercapnic on arrival, failing BiPAP 
appointment as it was marked no show. Patient admits today that he overslept for appointment and never rescheduled. New referral placed by primary care provider 4/29/25 to the sleep clinic for hypersomnolence.       At the time of my initial evaluation, he remains on BiPAP.   He is currently on treatment with a BiPAP with following settings:  IPAP: 20 cm H20.  EPAP: 8 cm H20.   Mask type:Full face mask.    He initially presented to [facility] ( [place], [place] with worsening of shortness of breath.     He is having cough: Yes  Duration of cough: for 2 days.   His cough is associated with sputum production: Yes    The sputum color: uncertain  Hemoptysis: No.    He is having chest pain: No    He gives a history of fever: No  Chills & rigors: Yes  Associated night sweats: Yes.  Recent travel history:No.    Recent sick contacts: No.    Patient admits he thinks he consumed bad chicken Sunday as he felt unwell and nauseous     Sleep/Wake schedule:  Usual time to go to bed during the work/regular day of week: 4:00 AM -6:00 AM  Usual time to wake up during the work//regular day of week: 8:00 AM - 9:00 AM  Nap another 2 hours  Patient works second shift 4:30PM to 1:00-3:00 AM depending on the day    Over the weekends HIS sleep schedule: Reports he will sleep all day on the weekend  He usually falls a sleep in less than: 5 minutes or less.  He takes naps: Yes.  Number of naps per week:  3 times.  During each nap HE spends a total of: 2hours  The naps were reported as refreshing: No.     Sleep Hygiene:    Is the temperature and evironment in HIS bed room is acceptable: Yes.   HE watches Television in HIS bed room: No.  HE read books, study, pay bills etc in the bed: No.  Frequency HE wake up during night/sleep: 1 or less  Majority of nocturnal awakenings are for urination: Yes.    Difficulty in falling back to sleep after nocturnal awakenings: No  .  Do you drink coffee: No.   Do 
obesity  DM  Elevated LFTs      Plan:    Daily I/o and weights  2 liter fluid restriction and 2gm sodium diet  Keep mag >2 and k >4  Daily BMP  Cxr today  Cont diuresis - ?change to po tomorrow  Cont aldactone  Referral to [facility]f clinic - ordered  Follow up dr jacques 2 weeks  BMP 1 week       Electronically signed by Vonnie Emery PA-C on 5/15/2025 at 11:13 AM          
completed shows patient had elevation in pCO2 overnight without the use of BiPAP by 12 mmHg, bedside spirometry shows restrictive defect.  The prescription for this device is required to decrease work of breathing and improve pulmonary status. ELY was considered and deemed not to be a major contributing factor If the patient does not receive this equipment it could lead to further exacerbations which can be potentially life threatening due to the severity of the disease.   -Will continue patient on BiPAP 20/8 overnight and as needed for daytime naps    -Avoid all sedative medications if he is drowsy.  -Aspiration precautions.  -Consider repeat ECHO outpatient after patient has initiated treatment on PAP therapy for 3 months to re-evaluate RVSP  -Diuretics per primary service/cardiology  -Follow pending cultures  -Titrate Oxygen/Fio2 to keep saturations >92%.  -Deep Venous Thrombosis Prophylaxis: lovenox per primary  -Will schedule patient for follow-up in pulm clinic in 6 to 8 weeks to review download from BiPAP  -Pulmonary service will sign off feel free to call with questions or concerns     Case discussed with primary service.    - Nilson NUNEZ Wehrieducated about my impression and plan. He verbalizes understanding.  Questions and concerns addressed.    Electronically signed by   JANUSZ Barnett CNP on 5/14/2025     Addendum by Dr. Valeria MD:  Patient seen by me independently including key components of medical care. Face to face evaluation and examination was performed. Case discussed with Mr. Per Mccoy CNP. Agree with Certified nurse practitioner's findings and plan as documented in the Certified nurse practitioner's note. Italicized font, if present,  represents changes to the note made by me. More than 50% of the encounter time involved with patient care by the Pulmonary & Critical care service team spent by me.    Please see my modifications mentioned below:  Right heart catheterization 
steatosis are observed. Correlate with liver  function tests.  3. Chronic findings are discussed.           Doppler BLE 5/9/25  IMPRESSION:  Normal venous ultrasound. No evidence for deep venous thrombosis.      **This report has been created using voice recognition software.  It may contain  minor errors which are inherent in voice recognition technology.**     Electronically signed by Dr. Eduardo Almanzar    (See actual reports for details)  Assessment   -Acute hypoxic and hypercapnic respiratory failure due to acute heart failure exacerbation vs ELY/OHS vs other. He is currently on treatment with BiPAP.  -Suspected OHS vs ELY - Mallampati class IV airway, BMI 60.7, witnessed apneas, snoring, excessive daytime tiredness with ESS 14  -PHTN suspect WHO group 2/3- RVSP 41 mmHg. CTA Chest negative for PE, low suspicion for CTEPH. Risk factors for PHTN Group II & III  -Small bilateral pleural effusions - 1x dose of diamox given 5/11/25. Bumex 1 mg IV twice daily on hold. BNP 6812 on admission. EF 50-55%. Suspect due to volume overload.  -Morbid Obesity with BMI 60.7  Plan   -Will continue patient on BiPAP 20/8 overnight and as needed for daytime naps    -Avoid all sedative medications if he is drowsy.  -Aspiration precautions.  -Bedside spirometry shows very severe restrictive defect will order for home BiPAP eval under OHS pathway with ABG tonight and tomorrow morning after using no BiPAP overnight   -Consider repeat ECHO outpatient after patient has initiated treatment on PAP therapy for 3 months to re-evaluate RVSP  -Diuretics per primary service   -Follow pending cultures  -Titrate Oxygen/Fio2 to keep saturations >92%.  -Deep Venous Thrombosis Prophylaxis: lovenox per primary     Case discussed with primary service.    - Nilson Perezhrieducated about my impression and plan. He verbalizes understanding.  Questions and concerns addressed.    Electronically signed by   JANUSZ Barnett - GABRIELA on 5/12/2025

## 2025-05-15 NOTE — DISCHARGE SUMMARY
Resident Discharge Summary (Hospitalist)      Patient: Nilson Hanson 54 y.o. male  : 1970  MRN: [id number]   Account: [id number]   Patient's PCP: Lai Irvin MD    Admit Date: 2025   Discharge Date:   05/15/25    Admitting Physician: Flako Coker MD  Discharge Physician: Aundrea N Newman-Waterhouse,        Discharge Diagnoses:  Acute Hypoxic and Hypercapnic Respiratory Failure: Suspect 2/2 pulmonary HTN. Initially required intubation after failing BiPAP, now on BiPAP at night and with naps. Pulmonology following, with study proving need for BiPAP on discharge. Echo showed EF 50-55% with severe systolic dysfunction of right ventricle. Bumex to help with pulmonary edema. Wean supplemental oxygen as tolerated with goal SPO2 >92%.   Discharge on 1 lpm with activity   BiPAP when sleeping  Follow up with Pulmonology  Obesity Hypoventilation Syndrome: Mallampati IV airway, BMI 60.7, not feeling refreshed with sleep, snoring, excessive daytime sleepiness. Pulmonology following, with trial proving need for BiPAP outpatient. Will discharge with home BiPAP. Continue BiPAP with daytime naps and at night. Will need formal sleep evaluation outpatient.  BiPAP at night and with day time naps  Follow up with Pulmonology clinic   Cor Pulmonale, with Pulmonary Hypertension type 2: RVSP 41 mmHg. CTA chest showed contrast back-flowing into right ventricle and IVC. Echo showed severe right ventricle systolic function. Risk of cardiac vs pulmonary cause, potentially from untreated ELY. Will need repeat echo outpatient in 3 months for re-evaluation of RVSP. RHC/LHC patent vessels with elevated CVP and WP with preserved CO.   Bumex 1 mg twice daily  Spironolactone daily  BMP in 1 week  Follow up with CHF clinic  Follow up with Dr. Green 2 weeks  Ascites: Hepatomegaly, steatosis and ascites noted on CTA. LFTs elevated. Suspect 2/2 right heart failure, however would need to evaluate ascitic fluid to further

## 2025-05-15 NOTE — PLAN OF CARE
Patient was evaluated today for the diagnosis of  pulmonary hypertension, obesity hypoventilation syndrome .  I entered a DME order for home oxygen at 1 lpm w/ activity because the diagnosis and testing require the patient to have supplemental oxygen.  Condition will improve or be benefited by oxygen use.  The patient is  able to perform good mobility in a home setting and therefore does require the use of a portable oxygen system.  The need for this equipment was discussed with the patient and he understands and is in agreement.      Electronically Signed by  Flako Moreno DO, MBA  PGY-2 Internal Medicine Resident  [facility]  On 5/15/2025 at 1:45 PM

## 2025-05-15 NOTE — CARE COORDINATION
5/15/25, 12:18 PM EDT    Patient goals/plan/ treatment preferences discussed by  and .  Patient goals/plan/ treatment preferences reviewed with patient/ family.  Patient/ family verbalize understanding of discharge plan and are in agreement with goal/plan/treatment preferences.  Understanding was demonstrated using the teach back method.  AVS provided by RN at time of discharge, which includes all necessary medical information pertaining to the patients current course of illness, treatment, post-discharge goals of care, and treatment preferences.     Services At/After Discharge: None  plans home alone, has SO/POA Laura, still drives; new [facility] BIPAP, 1L oxygen w activity, new CHF Clinic

## 2025-05-16 ENCOUNTER — TELEPHONE (OUTPATIENT)
Age: 55
End: 2025-05-16

## 2025-05-16 NOTE — TELEPHONE ENCOUNTER
Patient scheduled for pulm hfu per Per - 7.8.25 @ 1pm with Violet    Called and notified patient of appt date & times - also advised patient I would mail out reminder and to call with any questions. He verbalized understanding.

## 2025-05-16 NOTE — TELEPHONE ENCOUNTER
----- Message from JANUSZ Harris CNP sent at 5/14/2025  1:17 PM EDT -----    6 to 8-week follow-up new set up for BiPAP due to obesity hypoventilation syndrome    Thank you Per Mccoy

## 2025-05-20 ENCOUNTER — FOLLOWUP TELEPHONE ENCOUNTER (OUTPATIENT)
Dept: ADMINISTRATIVE | Age: 55
End: 2025-05-20

## 2025-05-30 ENCOUNTER — OFFICE VISIT (OUTPATIENT)
Dept: CARDIOLOGY CLINIC | Age: 55
End: 2025-05-30
Payer: COMMERCIAL

## 2025-05-30 VITALS
HEIGHT: 70 IN | BODY MASS INDEX: 45.1 KG/M2 | HEART RATE: 87 BPM | DIASTOLIC BLOOD PRESSURE: 78 MMHG | WEIGHT: 315 LBS | SYSTOLIC BLOOD PRESSURE: 128 MMHG

## 2025-05-30 DIAGNOSIS — I27.81 COR PULMONALE (HCC): Primary | ICD-10-CM

## 2025-05-30 DIAGNOSIS — E78.2 MIXED HYPERLIPIDEMIA: ICD-10-CM

## 2025-05-30 DIAGNOSIS — I50.32 CHRONIC DIASTOLIC HEART FAILURE (HCC): ICD-10-CM

## 2025-05-30 DIAGNOSIS — I31.39 PERICARDIAL EFFUSION: ICD-10-CM

## 2025-05-30 DIAGNOSIS — R94.31 ABNORMAL ECG: ICD-10-CM

## 2025-05-30 DIAGNOSIS — G47.33 OSA (OBSTRUCTIVE SLEEP APNEA): ICD-10-CM

## 2025-05-30 DIAGNOSIS — I27.20 PULMONARY HYPERTENSION (HCC): ICD-10-CM

## 2025-05-30 PROCEDURE — 93000 ELECTROCARDIOGRAM COMPLETE: CPT | Performed by: INTERNAL MEDICINE

## 2025-05-30 PROCEDURE — 1111F DSCHRG MED/CURRENT MED MERGE: CPT | Performed by: INTERNAL MEDICINE

## 2025-05-30 PROCEDURE — G8417 CALC BMI ABV UP PARAM F/U: HCPCS | Performed by: INTERNAL MEDICINE

## 2025-05-30 PROCEDURE — 99214 OFFICE O/P EST MOD 30 MIN: CPT | Performed by: INTERNAL MEDICINE

## 2025-05-30 PROCEDURE — G8427 DOCREV CUR MEDS BY ELIG CLIN: HCPCS | Performed by: INTERNAL MEDICINE

## 2025-05-30 PROCEDURE — 3017F COLORECTAL CA SCREEN DOC REV: CPT | Performed by: INTERNAL MEDICINE

## 2025-05-30 PROCEDURE — 1036F TOBACCO NON-USER: CPT | Performed by: INTERNAL MEDICINE

## 2025-06-11 PROBLEM — R79.89 ELEVATED TROPONIN: Status: RESOLVED | Noted: 2025-05-12 | Resolved: 2025-06-11

## 2025-06-13 ENCOUNTER — OFFICE VISIT (OUTPATIENT)
Dept: CARDIOLOGY CLINIC | Age: 55
End: 2025-06-13

## 2025-06-13 VITALS
WEIGHT: 315 LBS | HEART RATE: 73 BPM | OXYGEN SATURATION: 93 % | HEIGHT: 70 IN | SYSTOLIC BLOOD PRESSURE: 148 MMHG | DIASTOLIC BLOOD PRESSURE: 98 MMHG | BODY MASS INDEX: 45.1 KG/M2

## 2025-06-13 DIAGNOSIS — I27.20 PULMONARY HYPERTENSION (HCC): ICD-10-CM

## 2025-06-13 DIAGNOSIS — I50.32 CHRONIC DIASTOLIC HEART FAILURE (HCC): Primary | ICD-10-CM

## 2025-06-13 DIAGNOSIS — I27.81 COR PULMONALE (HCC): ICD-10-CM

## 2025-06-13 RX ORDER — SPIRONOLACTONE 25 MG/1
25 TABLET ORAL DAILY
Qty: 90 TABLET | Refills: 3 | Status: SHIPPED | OUTPATIENT
Start: 2025-06-13

## 2025-06-13 RX ORDER — BUMETANIDE 1 MG/1
1 TABLET ORAL 2 TIMES DAILY
Qty: 180 TABLET | Refills: 3 | Status: SHIPPED | OUTPATIENT
Start: 2025-06-13

## 2025-06-13 NOTE — PATIENT INSTRUCTIONS
Continue current medications as prescribed.    Stay as active as you can.     Eat heart healthy diet.     Follow-up with your PCP as scheduled.    Follow-up with Dr. Green on 10/3/2025 as scheduled or sooner if need.     Follow-up in CHF clinic with JANUSZ Santiago CNP in 8 weeks or sooner if need.       Office hours:   Mon-Thurs 8-4:30  Friday 8-12  Phone: [phone]    Continue:  Continue current medications  Daily weights and record  Fluid restriction of 2 Liters per day  Limit sodium in diet to around 7627-8329 mg/day  Monitor BP    Call the Heart Failure Clinic for any of the following symptoms:   Weight gain of 3 pounds in 1 day or 5 pounds in 1 week  Increased shortness of breath  Shortness of breath while laying down  Chest pain  Swelling in feet, ankles or legs  Bloating in abdomen  Fatigue     See the Pulmonologist as scheduled - they will call to set up this appointment.

## 2025-06-13 NOTE — PROGRESS NOTES
Nilson Hanson (:  1970) is a 54 y.o. male, Established patient, here for new CHF clinic evaluation.     Primary cardiologist: Dr. Green      Congestive Heart Failure (New to provider)    HPI:  Seen in office on 2025 per Dr. Green in hospital follow-up for cor pulmonale and pulmonary hypertension.  Per office note:  The patient is a 54 y.o. white male who presents for cardiac follow-up after recent hospitalization for respiratory failure caused by severe pulmonary hypertension and cor pulmonale.  The patient did undergo right and left heart catheterization when in the hospital.  He was found to have patent coronaries.  Pulmonary pressure was 52/27 with a mean of 37 and a pulmonary capillary wedge pressure of 16.  This gives a transpulmonary gradient of 21 mmHg.  The patient has occasional right rib pain when laying on that side.  He does not have any exertional chest discomfort.  He denies any shortness of breath at rest but does have dyspnea on exertion.  He denies any palpitations, heart racing, skipping or pounding.  He denies any dizziness or lightheadedness.  He denies any syncope or near syncope.  No significant edema.  The patient does not have a history of hypertension.  He has diabetes mellitus type 2.  He has hyperlipidemia.  He is a former smoker quit .  There is no significant family history of of heart problems.  He does have obstructive sleep apnea and pulmonary hypertension.  Patient also is significantly obese.  Diuretics were continued.  Repeat echo planned for several months to reassess right-sided chamber size and function.  To follow-up with pulmonology regarding his underlying significant lung disease.  Pulmonary hypertension with evidence of PA pressure of 52/27 with a mean of 37 and transpulmonary gradient of 21 mmHg.  Chronic diastolic heart failure diagnosed via pulmonary capillary wedge pressure of 16.  Cardiac output was preserved.  Merrily right heart failure from

## 2025-07-01 ENCOUNTER — HOSPITAL ENCOUNTER (OUTPATIENT)
Dept: PULMONOLOGY | Age: 55
Discharge: HOME | End: 2025-07-01
Attending: NURSE PRACTITIONER
Payer: COMMERCIAL

## 2025-07-01 DIAGNOSIS — I27.20 PULMONARY HYPERTENSION (HCC): ICD-10-CM

## 2025-07-01 PROCEDURE — 94726 PLETHYSMOGRAPHY LUNG VOLUMES: CPT

## 2025-07-01 PROCEDURE — 94060 EVALUATION OF WHEEZING: CPT

## 2025-07-01 PROCEDURE — 94729 DIFFUSING CAPACITY: CPT

## 2025-07-02 NOTE — PROGRESS NOTES
[facility]    Patient: ANNA OJEDA, 54 y.o.   : 1970    Patient of Dr. Shaw    Assessment/Plan     Assessment & Plan  1. OHS: Chronic.  - BiPAP 20/8 with 87% compliance, averaging 5 hours and 31 minutes of use per night. AHI well controlled at 3.8. 30 L leak.  - Ensure proper mask fit and consider trimming facial hair to reduce leakage.  - Contact medical company for a different mask style if leakage persists.  - Emphasize continued weight loss efforts as the best strategy for potentially discontinuing BiPAP therapy in the future.  - 6 MWT - Did not qualify for supplemental O2. Order placed to discontinue home oxygen    2. Pulmonary hypertension: RVSP 41 mmHg.  - Echocardiogram to be repeated in approximately 3 months to assess for improvement.    3. Congestive heart failure (CHF): Chronic.  - Continue diuretic as prescribed.  - Compression stockings.    4. Former smoker.  - 15 to 20 pack-year history, quit in .  - No further lung screenings necessary as it has been over 15 years since the last cigarette.    5. Bilateral pleural effusion.  - Chest x-ray now to ensure resolution of previous fluid overload outside the lungs.    6. Restrictive lung disease due to obesity  - Patient encouraged weight loss        Reviewed preventative vaccinations    Advised patient to call office with any changes, questions, or concerns regarding respiratory status or issues with prescribed medications    Follow up 3 months    The patient (or guardian, if applicable) and other individuals in attendance with the patient were advised that Artificial Intelligence will be utilized during this visit to record, process the conversation to generate a clinical note, and support improvement of the AI technology. The patient (or guardian, if applicable) and other individuals in attendance at the appointment consented to the use of AI, including the recording.       Subjective

## 2025-07-08 ENCOUNTER — OFFICE VISIT (OUTPATIENT)
Dept: PULMONOLOGY | Age: 55
End: 2025-07-08
Payer: COMMERCIAL

## 2025-07-08 VITALS
TEMPERATURE: 98.4 F | WEIGHT: 315 LBS | SYSTOLIC BLOOD PRESSURE: 124 MMHG | OXYGEN SATURATION: 95 % | BODY MASS INDEX: 45.1 KG/M2 | HEIGHT: 70 IN | DIASTOLIC BLOOD PRESSURE: 70 MMHG | HEART RATE: 64 BPM

## 2025-07-08 DIAGNOSIS — E66.2 OBESITY HYPOVENTILATION SYNDROME (HCC): Primary | ICD-10-CM

## 2025-07-08 DIAGNOSIS — J90 BILATERAL PLEURAL EFFUSION: ICD-10-CM

## 2025-07-08 DIAGNOSIS — Z87.891 PERSONAL HISTORY OF TOBACCO USE: ICD-10-CM

## 2025-07-08 DIAGNOSIS — I50.32 CHRONIC DIASTOLIC HEART FAILURE (HCC): ICD-10-CM

## 2025-07-08 DIAGNOSIS — E66.2 OBESITY HYPOVENTILATION SYNDROME (HCC): ICD-10-CM

## 2025-07-08 DIAGNOSIS — J98.4 RESTRICTIVE LUNG DISEASE: ICD-10-CM

## 2025-07-08 DIAGNOSIS — I27.20 PULMONARY HYPERTENSION (HCC): ICD-10-CM

## 2025-07-08 PROCEDURE — 1036F TOBACCO NON-USER: CPT

## 2025-07-08 PROCEDURE — 3017F COLORECTAL CA SCREEN DOC REV: CPT

## 2025-07-08 PROCEDURE — G8417 CALC BMI ABV UP PARAM F/U: HCPCS

## 2025-07-08 PROCEDURE — 99214 OFFICE O/P EST MOD 30 MIN: CPT

## 2025-07-08 PROCEDURE — G8427 DOCREV CUR MEDS BY ELIG CLIN: HCPCS

## 2025-07-08 PROCEDURE — 94618 PULMONARY STRESS TESTING: CPT

## 2025-08-15 ENCOUNTER — OFFICE VISIT (OUTPATIENT)
Dept: CARDIOLOGY CLINIC | Age: 55
End: 2025-08-15
Payer: COMMERCIAL

## 2025-08-15 VITALS
HEIGHT: 70 IN | BODY MASS INDEX: 45.1 KG/M2 | OXYGEN SATURATION: 96 % | WEIGHT: 315 LBS | HEART RATE: 69 BPM | DIASTOLIC BLOOD PRESSURE: 98 MMHG | SYSTOLIC BLOOD PRESSURE: 158 MMHG

## 2025-08-15 DIAGNOSIS — E66.813 CLASS 3 OBESITY (HCC): ICD-10-CM

## 2025-08-15 DIAGNOSIS — I27.81 COR PULMONALE (HCC): ICD-10-CM

## 2025-08-15 DIAGNOSIS — I50.32 CHRONIC DIASTOLIC HEART FAILURE (HCC): Primary | ICD-10-CM

## 2025-08-15 DIAGNOSIS — I27.20 PULMONARY HYPERTENSION (HCC): ICD-10-CM

## 2025-08-15 DIAGNOSIS — G47.33 OSA (OBSTRUCTIVE SLEEP APNEA): ICD-10-CM

## 2025-08-15 PROCEDURE — 99214 OFFICE O/P EST MOD 30 MIN: CPT | Performed by: NURSE PRACTITIONER

## 2025-08-15 PROCEDURE — 3017F COLORECTAL CA SCREEN DOC REV: CPT | Performed by: NURSE PRACTITIONER

## 2025-08-15 PROCEDURE — G8427 DOCREV CUR MEDS BY ELIG CLIN: HCPCS | Performed by: NURSE PRACTITIONER

## 2025-08-15 PROCEDURE — G8417 CALC BMI ABV UP PARAM F/U: HCPCS | Performed by: NURSE PRACTITIONER

## 2025-08-15 PROCEDURE — 1036F TOBACCO NON-USER: CPT | Performed by: NURSE PRACTITIONER
